# Patient Record
Sex: FEMALE | Race: WHITE | HISPANIC OR LATINO | URBAN - METROPOLITAN AREA
[De-identification: names, ages, dates, MRNs, and addresses within clinical notes are randomized per-mention and may not be internally consistent; named-entity substitution may affect disease eponyms.]

---

## 2017-08-18 ENCOUNTER — EMERGENCY (EMERGENCY)
Facility: HOSPITAL | Age: 22
LOS: 1 days | Discharge: LEFT BEFORE DISCHARGE | End: 2017-08-18
Admitting: EMERGENCY MEDICINE

## 2017-08-18 VITALS
RESPIRATION RATE: 18 BRPM | SYSTOLIC BLOOD PRESSURE: 137 MMHG | OXYGEN SATURATION: 100 % | HEART RATE: 75 BPM | DIASTOLIC BLOOD PRESSURE: 72 MMHG | TEMPERATURE: 98 F

## 2018-07-23 ENCOUNTER — INPATIENT (INPATIENT)
Facility: HOSPITAL | Age: 23
LOS: 1 days | Discharge: PSYCHIATRIC OR SUBSTANCE USE FACILITY | End: 2018-07-25
Attending: HOSPITALIST | Admitting: HOSPITALIST
Payer: MEDICAID

## 2018-07-23 VITALS
DIASTOLIC BLOOD PRESSURE: 81 MMHG | OXYGEN SATURATION: 100 % | SYSTOLIC BLOOD PRESSURE: 128 MMHG | HEART RATE: 94 BPM | TEMPERATURE: 98 F | RESPIRATION RATE: 16 BRPM

## 2018-07-23 LAB
APAP SERPL-MCNC: < 15 UG/ML — LOW (ref 15–25)
BASE EXCESS BLDV CALC-SCNC: -0.7 MMOL/L — SIGNIFICANT CHANGE UP
BASOPHILS # BLD AUTO: 0.01 K/UL — SIGNIFICANT CHANGE UP (ref 0–0.2)
BASOPHILS NFR BLD AUTO: 0.1 % — SIGNIFICANT CHANGE UP (ref 0–2)
BLOOD GAS VENOUS - CREATININE: 0.69 MG/DL — SIGNIFICANT CHANGE UP (ref 0.5–1.3)
CHLORIDE BLDV-SCNC: 109 MMOL/L — HIGH (ref 96–108)
EOSINOPHIL # BLD AUTO: 0.04 K/UL — SIGNIFICANT CHANGE UP (ref 0–0.5)
EOSINOPHIL NFR BLD AUTO: 0.5 % — SIGNIFICANT CHANGE UP (ref 0–6)
ETHANOL BLD-MCNC: < 10 MG/DL — SIGNIFICANT CHANGE UP
GAS PNL BLDV: 139 MMOL/L — SIGNIFICANT CHANGE UP (ref 136–146)
GLUCOSE BLDV-MCNC: 88 — SIGNIFICANT CHANGE UP (ref 70–99)
HCO3 BLDV-SCNC: 23 MMOL/L — SIGNIFICANT CHANGE UP (ref 20–27)
HCT VFR BLD CALC: 35.3 % — SIGNIFICANT CHANGE UP (ref 34.5–45)
HCT VFR BLDV CALC: 39.1 % — SIGNIFICANT CHANGE UP (ref 34.5–45)
HGB BLD-MCNC: 12 G/DL — SIGNIFICANT CHANGE UP (ref 11.5–15.5)
HGB BLDV-MCNC: 12.7 G/DL — SIGNIFICANT CHANGE UP (ref 11.5–15.5)
IMM GRANULOCYTES # BLD AUTO: 0.02 # — SIGNIFICANT CHANGE UP
IMM GRANULOCYTES NFR BLD AUTO: 0.3 % — SIGNIFICANT CHANGE UP (ref 0–1.5)
LACTATE BLDV-MCNC: 1.6 MMOL/L — SIGNIFICANT CHANGE UP (ref 0.5–2)
LYMPHOCYTES # BLD AUTO: 1.11 K/UL — SIGNIFICANT CHANGE UP (ref 1–3.3)
LYMPHOCYTES # BLD AUTO: 14.8 % — SIGNIFICANT CHANGE UP (ref 13–44)
MCHC RBC-ENTMCNC: 30.5 PG — SIGNIFICANT CHANGE UP (ref 27–34)
MCHC RBC-ENTMCNC: 34 % — SIGNIFICANT CHANGE UP (ref 32–36)
MCV RBC AUTO: 89.6 FL — SIGNIFICANT CHANGE UP (ref 80–100)
MONOCYTES # BLD AUTO: 0.45 K/UL — SIGNIFICANT CHANGE UP (ref 0–0.9)
MONOCYTES NFR BLD AUTO: 6 % — SIGNIFICANT CHANGE UP (ref 2–14)
NEUTROPHILS # BLD AUTO: 5.88 K/UL — SIGNIFICANT CHANGE UP (ref 1.8–7.4)
NEUTROPHILS NFR BLD AUTO: 78.3 % — HIGH (ref 43–77)
NRBC # FLD: 0 — SIGNIFICANT CHANGE UP
PCO2 BLDV: 38 MMHG — LOW (ref 41–51)
PH BLDV: 7.41 PH — SIGNIFICANT CHANGE UP (ref 7.32–7.43)
PLATELET # BLD AUTO: 230 K/UL — SIGNIFICANT CHANGE UP (ref 150–400)
PMV BLD: 11.8 FL — SIGNIFICANT CHANGE UP (ref 7–13)
PO2 BLDV: 31 MMHG — LOW (ref 35–40)
POTASSIUM BLDV-SCNC: 3.2 MMOL/L — LOW (ref 3.4–4.5)
RBC # BLD: 3.94 M/UL — SIGNIFICANT CHANGE UP (ref 3.8–5.2)
RBC # FLD: 11.9 % — SIGNIFICANT CHANGE UP (ref 10.3–14.5)
SALICYLATES SERPL-MCNC: 17.4 MG/DL — SIGNIFICANT CHANGE UP (ref 15–30)
SAO2 % BLDV: 56.4 % — LOW (ref 60–85)
WBC # BLD: 7.51 K/UL — SIGNIFICANT CHANGE UP (ref 3.8–10.5)
WBC # FLD AUTO: 7.51 K/UL — SIGNIFICANT CHANGE UP (ref 3.8–10.5)

## 2018-07-23 RX ORDER — ONDANSETRON 8 MG/1
4 TABLET, FILM COATED ORAL ONCE
Qty: 0 | Refills: 0 | Status: COMPLETED | OUTPATIENT
Start: 2018-07-23 | End: 2018-07-23

## 2018-07-23 RX ORDER — SODIUM CHLORIDE 9 MG/ML
1000 INJECTION, SOLUTION INTRAVENOUS ONCE
Qty: 0 | Refills: 0 | Status: COMPLETED | OUTPATIENT
Start: 2018-07-23 | End: 2018-07-23

## 2018-07-23 RX ORDER — ACTIVATED CHARCOAL 25 G/120ML
60 SUSPENSION, ORAL (FINAL DOSE FORM) ORAL ONCE
Qty: 0 | Refills: 0 | Status: COMPLETED | OUTPATIENT
Start: 2018-07-23 | End: 2018-07-23

## 2018-07-23 RX ADMIN — SODIUM CHLORIDE 1000 MILLILITER(S): 9 INJECTION, SOLUTION INTRAVENOUS at 23:14

## 2018-07-23 RX ADMIN — ONDANSETRON 4 MILLIGRAM(S): 8 TABLET, FILM COATED ORAL at 23:14
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
## 2018-07-23 NOTE — ED ADULT NURSE NOTE - OBJECTIVE STATEMENT
pt received alert and oriented x3.pt was bought in by ems for suicidal attempt. pt took 20-30 aspirins and half a bottle of two types on cough syrups. pt had 1 episode of emesis in ER. pt appears slightly lethargic in bed and feels drowsy. pt denies chest pain, sob, lightheadedness. abd soft and non tender. 20 g placed left a/c. labs drawn and sent. pca at bedside for co. pt belongings secured across room 21 . will continue to monitor. pt received alert and oriented x3.pt was bought in by ems for suicidal attempt. pt took 20-30 aspirins and half a bottle of two types on cough syrups. pt had 1 episode of emesis in ER. pt appears slightly lethargic in bed and feels drowsy. pt denies chest pain, sob, lightheadedness ,HI, hearing voices, hallucinations. abd soft and non tender. 20 g placed left a/c. labs drawn and sent. pca at bedside for co. pt belongings secured across room 21 . will continue to monitor.

## 2018-07-23 NOTE — ED PROVIDER NOTE - MEDICAL DECISION MAKING DETAILS
Tox paged, states to give activated charcoal. Will get Salicylate levels q2 hours until 3 downtrending labs result then clear pt for psychiatry. Tox paged, states to give activated charcoal. Will get Salicylate levels q2 hours until 3 downtrending labs result then clear pt for psychiatry. jennifer

## 2018-07-23 NOTE — ED ADULT TRIAGE NOTE - CHIEF COMPLAINT QUOTE
pt brought in by EMS for r/o overdose. pt told ems she took 50x aspirin and cough medicine in attempt to kill herself. +SI. denies HI. denies alcohol use. pt calm at this time, states "I don't feel well". denies pmh

## 2018-07-23 NOTE — ED PROVIDER NOTE - NS ED ROS FT
CONSTITUTIONAL: No fevers, no chills  Eyes: no visual changes  Ears: no ear drainage, no ear pain  Nose: no nasal congestion  Mouth/Throat: no sore throat  Cardiovascular: No Chest pain  Respiratory: No SOB  Gastrointestinal: +nausea and vomiting, no abd pain  Genitourinary: no dysuria, no hematuria  SKIN: no rashes.  NEURO: no headache  PSYCHIATRIC: +SI

## 2018-07-23 NOTE — ED PROVIDER NOTE - SHIFT CHANGE DETAILS
Signed out pending labs and continued treatment. Patient with salicylate OD, given charcoal per tox recommendation. Will need follow up for treatment and will need to be admitted.

## 2018-07-23 NOTE — ED PROVIDER NOTE - OBJECTIVE STATEMENT
23 YOF no chronic pmh, previous SI attempt at age 10, p/w SI attempt, pt took 30-50 pills of aspirin, took promethazine syrup half bottle, drank nyquil half bottle. consumption was staggered but started around 730. +nausea and vomiting. No chest pain, no abdominal pain. Pt denies fevers, chills, cough. Pt states she has been depressed and wanted to kill herself today, but then she called an ambulance because she was worried.

## 2018-07-24 DIAGNOSIS — T39.091A POISONING BY SALICYLATES, ACCIDENTAL (UNINTENTIONAL), INITIAL ENCOUNTER: ICD-10-CM

## 2018-07-24 DIAGNOSIS — F33.2 MAJOR DEPRESSIVE DISORDER, RECURRENT SEVERE WITHOUT PSYCHOTIC FEATURES: ICD-10-CM

## 2018-07-24 DIAGNOSIS — Z00.00 ENCOUNTER FOR GENERAL ADULT MEDICAL EXAMINATION WITHOUT ABNORMAL FINDINGS: ICD-10-CM

## 2018-07-24 DIAGNOSIS — R11.2 NAUSEA WITH VOMITING, UNSPECIFIED: ICD-10-CM

## 2018-07-24 DIAGNOSIS — T50.902A POISONING BY UNSPECIFIED DRUGS, MEDICAMENTS AND BIOLOGICAL SUBSTANCES, INTENTIONAL SELF-HARM, INITIAL ENCOUNTER: ICD-10-CM

## 2018-07-24 DIAGNOSIS — Z98.891 HISTORY OF UTERINE SCAR FROM PREVIOUS SURGERY: Chronic | ICD-10-CM

## 2018-07-24 LAB
ALBUMIN SERPL ELPH-MCNC: 4.5 G/DL — SIGNIFICANT CHANGE UP (ref 3.3–5)
ALP SERPL-CCNC: 54 U/L — SIGNIFICANT CHANGE UP (ref 40–120)
ALT FLD-CCNC: 8 U/L — SIGNIFICANT CHANGE UP (ref 4–33)
AMPHET UR-MCNC: NEGATIVE — SIGNIFICANT CHANGE UP
APAP SERPL-MCNC: < 15 UG/ML — LOW (ref 15–25)
APPEARANCE UR: SIGNIFICANT CHANGE UP
AST SERPL-CCNC: 16 U/L — SIGNIFICANT CHANGE UP (ref 4–32)
BARBITURATES UR SCN-MCNC: NEGATIVE — SIGNIFICANT CHANGE UP
BASE EXCESS BLDA CALC-SCNC: -3 MMOL/L — SIGNIFICANT CHANGE UP
BASE EXCESS BLDA CALC-SCNC: -3.3 MMOL/L — SIGNIFICANT CHANGE UP
BASE EXCESS BLDV CALC-SCNC: -3 MMOL/L — SIGNIFICANT CHANGE UP
BENZODIAZ UR-MCNC: NEGATIVE — SIGNIFICANT CHANGE UP
BILIRUB SERPL-MCNC: 0.2 MG/DL — SIGNIFICANT CHANGE UP (ref 0.2–1.2)
BILIRUB UR-MCNC: NEGATIVE — SIGNIFICANT CHANGE UP
BLOOD GAS VENOUS - CREATININE: 0.64 MG/DL — SIGNIFICANT CHANGE UP (ref 0.5–1.3)
BLOOD UR QL VISUAL: HIGH
BUN SERPL-MCNC: 4 MG/DL — LOW (ref 7–23)
BUN SERPL-MCNC: 4 MG/DL — LOW (ref 7–23)
BUN SERPL-MCNC: 5 MG/DL — LOW (ref 7–23)
BUN SERPL-MCNC: 5 MG/DL — LOW (ref 7–23)
BUN SERPL-MCNC: 6 MG/DL — LOW (ref 7–23)
CALCIUM SERPL-MCNC: 8.1 MG/DL — LOW (ref 8.4–10.5)
CALCIUM SERPL-MCNC: 8.1 MG/DL — LOW (ref 8.4–10.5)
CALCIUM SERPL-MCNC: 8.2 MG/DL — LOW (ref 8.4–10.5)
CALCIUM SERPL-MCNC: 8.2 MG/DL — LOW (ref 8.4–10.5)
CALCIUM SERPL-MCNC: 9 MG/DL — SIGNIFICANT CHANGE UP (ref 8.4–10.5)
CANNABINOIDS UR-MCNC: NEGATIVE — SIGNIFICANT CHANGE UP
CHLORIDE BLDA-SCNC: 113 MMOL/L — HIGH (ref 96–108)
CHLORIDE BLDA-SCNC: 113 MMOL/L — HIGH (ref 96–108)
CHLORIDE BLDV-SCNC: 113 MMOL/L — HIGH (ref 96–108)
CHLORIDE SERPL-SCNC: 104 MMOL/L — SIGNIFICANT CHANGE UP (ref 98–107)
CHLORIDE SERPL-SCNC: 105 MMOL/L — SIGNIFICANT CHANGE UP (ref 98–107)
CHLORIDE SERPL-SCNC: 107 MMOL/L — SIGNIFICANT CHANGE UP (ref 98–107)
CHLORIDE SERPL-SCNC: 108 MMOL/L — HIGH (ref 98–107)
CHLORIDE SERPL-SCNC: 109 MMOL/L — HIGH (ref 98–107)
CK SERPL-CCNC: 42 U/L — SIGNIFICANT CHANGE UP (ref 25–170)
CO2 SERPL-SCNC: 18 MMOL/L — LOW (ref 22–31)
CO2 SERPL-SCNC: 21 MMOL/L — LOW (ref 22–31)
CO2 SERPL-SCNC: 21 MMOL/L — LOW (ref 22–31)
CO2 SERPL-SCNC: 22 MMOL/L — SIGNIFICANT CHANGE UP (ref 22–31)
CO2 SERPL-SCNC: 25 MMOL/L — SIGNIFICANT CHANGE UP (ref 22–31)
COCAINE METAB.OTHER UR-MCNC: NEGATIVE — SIGNIFICANT CHANGE UP
COLOR SPEC: HIGH
CREAT SERPL-MCNC: 0.68 MG/DL — SIGNIFICANT CHANGE UP (ref 0.5–1.3)
CREAT SERPL-MCNC: 0.69 MG/DL — SIGNIFICANT CHANGE UP (ref 0.5–1.3)
CREAT SERPL-MCNC: 0.69 MG/DL — SIGNIFICANT CHANGE UP (ref 0.5–1.3)
CREAT SERPL-MCNC: 0.72 MG/DL — SIGNIFICANT CHANGE UP (ref 0.5–1.3)
CREAT SERPL-MCNC: 0.72 MG/DL — SIGNIFICANT CHANGE UP (ref 0.5–1.3)
GAS PNL BLDV: 141 MMOL/L — SIGNIFICANT CHANGE UP (ref 136–146)
GLUCOSE BLDA-MCNC: 110 MG/DL — HIGH (ref 70–99)
GLUCOSE BLDA-MCNC: 130 MG/DL — HIGH (ref 70–99)
GLUCOSE BLDV-MCNC: 110 — HIGH (ref 70–99)
GLUCOSE SERPL-MCNC: 111 MG/DL — HIGH (ref 70–99)
GLUCOSE SERPL-MCNC: 118 MG/DL — HIGH (ref 70–99)
GLUCOSE SERPL-MCNC: 134 MG/DL — HIGH (ref 70–99)
GLUCOSE SERPL-MCNC: 88 MG/DL — SIGNIFICANT CHANGE UP (ref 70–99)
GLUCOSE SERPL-MCNC: 88 MG/DL — SIGNIFICANT CHANGE UP (ref 70–99)
GLUCOSE UR-MCNC: 50 — SIGNIFICANT CHANGE UP
HCG SERPL-ACNC: < 5 MIU/ML — SIGNIFICANT CHANGE UP
HCO3 BLDA-SCNC: 22 MMOL/L — SIGNIFICANT CHANGE UP (ref 22–26)
HCO3 BLDA-SCNC: 22 MMOL/L — SIGNIFICANT CHANGE UP (ref 22–26)
HCO3 BLDV-SCNC: 22 MMOL/L — SIGNIFICANT CHANGE UP (ref 20–27)
HCT VFR BLDA CALC: 34.7 % — SIGNIFICANT CHANGE UP (ref 34.5–46.5)
HCT VFR BLDA CALC: 35.1 % — SIGNIFICANT CHANGE UP (ref 34.5–46.5)
HCT VFR BLDV CALC: 35.1 % — SIGNIFICANT CHANGE UP (ref 34.5–45)
HGB BLDA-MCNC: 11.3 G/DL — LOW (ref 11.5–15.5)
HGB BLDA-MCNC: 11.4 G/DL — LOW (ref 11.5–15.5)
HGB BLDV-MCNC: 11.4 G/DL — LOW (ref 11.5–15.5)
KETONES UR-MCNC: NEGATIVE — SIGNIFICANT CHANGE UP
LACTATE BLDA-SCNC: 1.4 MMOL/L — SIGNIFICANT CHANGE UP (ref 0.5–2)
LACTATE BLDA-SCNC: 2.2 MMOL/L — HIGH (ref 0.5–2)
LACTATE BLDV-MCNC: 1.4 MMOL/L — SIGNIFICANT CHANGE UP (ref 0.5–2)
LEUKOCYTE ESTERASE UR-ACNC: HIGH
MAGNESIUM SERPL-MCNC: 1.9 MG/DL — SIGNIFICANT CHANGE UP (ref 1.6–2.6)
MAGNESIUM SERPL-MCNC: 1.9 MG/DL — SIGNIFICANT CHANGE UP (ref 1.6–2.6)
METHADONE UR-MCNC: NEGATIVE — SIGNIFICANT CHANGE UP
NITRITE UR-MCNC: NEGATIVE — SIGNIFICANT CHANGE UP
OPIATES UR-MCNC: NEGATIVE — SIGNIFICANT CHANGE UP
OXYCODONE UR-MCNC: NEGATIVE — SIGNIFICANT CHANGE UP
PCO2 BLDA: 28 MMHG — LOW (ref 32–48)
PCO2 BLDA: 36 MMHG — SIGNIFICANT CHANGE UP (ref 32–48)
PCO2 BLDV: 36 MMHG — LOW (ref 41–51)
PCP UR-MCNC: NEGATIVE — SIGNIFICANT CHANGE UP
PH BLDA: 7.39 PH — SIGNIFICANT CHANGE UP (ref 7.35–7.45)
PH BLDA: 7.46 PH — HIGH (ref 7.35–7.45)
PH BLDV: 7.39 PH — SIGNIFICANT CHANGE UP (ref 7.32–7.43)
PH UR: 8.5 — HIGH (ref 4.6–8)
PHOSPHATE SERPL-MCNC: 2.3 MG/DL — LOW (ref 2.5–4.5)
PHOSPHATE SERPL-MCNC: 3.7 MG/DL — SIGNIFICANT CHANGE UP (ref 2.5–4.5)
PO2 BLDA: 177 MMHG — HIGH (ref 83–108)
PO2 BLDA: 49 MMHG — CRITICAL LOW (ref 83–108)
PO2 BLDV: 49 MMHG — HIGH (ref 35–40)
POTASSIUM BLDA-SCNC: 3.1 MMOL/L — LOW (ref 3.4–4.5)
POTASSIUM BLDA-SCNC: 3.3 MMOL/L — LOW (ref 3.4–4.5)
POTASSIUM BLDV-SCNC: 3.3 MMOL/L — LOW (ref 3.4–4.5)
POTASSIUM SERPL-MCNC: 3 MMOL/L — LOW (ref 3.5–5.3)
POTASSIUM SERPL-MCNC: 3.3 MMOL/L — LOW (ref 3.5–5.3)
POTASSIUM SERPL-MCNC: 3.4 MMOL/L — LOW (ref 3.5–5.3)
POTASSIUM SERPL-MCNC: 3.5 MMOL/L — SIGNIFICANT CHANGE UP (ref 3.5–5.3)
POTASSIUM SERPL-MCNC: 3.6 MMOL/L — SIGNIFICANT CHANGE UP (ref 3.5–5.3)
POTASSIUM SERPL-SCNC: 3 MMOL/L — LOW (ref 3.5–5.3)
POTASSIUM SERPL-SCNC: 3.3 MMOL/L — LOW (ref 3.5–5.3)
POTASSIUM SERPL-SCNC: 3.4 MMOL/L — LOW (ref 3.5–5.3)
POTASSIUM SERPL-SCNC: 3.5 MMOL/L — SIGNIFICANT CHANGE UP (ref 3.5–5.3)
POTASSIUM SERPL-SCNC: 3.6 MMOL/L — SIGNIFICANT CHANGE UP (ref 3.5–5.3)
PROT SERPL-MCNC: 7.4 G/DL — SIGNIFICANT CHANGE UP (ref 6–8.3)
PROT UR-MCNC: 300 MG/DL — HIGH
RBC CASTS # UR COMP ASSIST: >50 — HIGH (ref 0–?)
SALICYLATES SERPL-MCNC: 21.6 MG/DL — SIGNIFICANT CHANGE UP (ref 15–30)
SALICYLATES SERPL-MCNC: 29 MG/DL — SIGNIFICANT CHANGE UP (ref 15–30)
SALICYLATES SERPL-MCNC: 33.6 MG/DL — HIGH (ref 15–30)
SALICYLATES SERPL-MCNC: 34.9 MG/DL — HIGH (ref 15–30)
SALICYLATES SERPL-MCNC: 37.7 MG/DL — HIGH (ref 15–30)
SALICYLATES SERPL-MCNC: 38.2 MG/DL — HIGH (ref 15–30)
SAO2 % BLDA: 83 % — LOW (ref 95–99)
SAO2 % BLDA: 99.5 % — HIGH (ref 95–99)
SAO2 % BLDV: 83 % — SIGNIFICANT CHANGE UP (ref 60–85)
SODIUM BLDA-SCNC: 141 MMOL/L — SIGNIFICANT CHANGE UP (ref 136–146)
SODIUM BLDA-SCNC: 141 MMOL/L — SIGNIFICANT CHANGE UP (ref 136–146)
SODIUM SERPL-SCNC: 140 MMOL/L — SIGNIFICANT CHANGE UP (ref 135–145)
SODIUM SERPL-SCNC: 141 MMOL/L — SIGNIFICANT CHANGE UP (ref 135–145)
SODIUM SERPL-SCNC: 142 MMOL/L — SIGNIFICANT CHANGE UP (ref 135–145)
SODIUM SERPL-SCNC: 143 MMOL/L — SIGNIFICANT CHANGE UP (ref 135–145)
SODIUM SERPL-SCNC: 143 MMOL/L — SIGNIFICANT CHANGE UP (ref 135–145)
SP GR SPEC: 1.02 — SIGNIFICANT CHANGE UP (ref 1–1.04)
TSH SERPL-MCNC: 1.91 UIU/ML — SIGNIFICANT CHANGE UP (ref 0.27–4.2)
UROBILINOGEN FLD QL: NORMAL MG/DL — SIGNIFICANT CHANGE UP
WBC UR QL: SIGNIFICANT CHANGE UP (ref 0–?)

## 2018-07-24 PROCEDURE — 90792 PSYCH DIAG EVAL W/MED SRVCS: CPT

## 2018-07-24 PROCEDURE — 99291 CRITICAL CARE FIRST HOUR: CPT

## 2018-07-24 PROCEDURE — 99255 IP/OBS CONSLTJ NEW/EST HI 80: CPT

## 2018-07-24 RX ORDER — CHLORHEXIDINE GLUCONATE 213 G/1000ML
1 SOLUTION TOPICAL
Qty: 0 | Refills: 0 | Status: DISCONTINUED | OUTPATIENT
Start: 2018-07-24 | End: 2018-07-25

## 2018-07-24 RX ORDER — LANOLIN ALCOHOL/MO/W.PET/CERES
3 CREAM (GRAM) TOPICAL AT BEDTIME
Qty: 0 | Refills: 0 | Status: DISCONTINUED | OUTPATIENT
Start: 2018-07-24 | End: 2018-07-25

## 2018-07-24 RX ORDER — POTASSIUM CHLORIDE 20 MEQ
40 PACKET (EA) ORAL ONCE
Qty: 0 | Refills: 0 | Status: DISCONTINUED | OUTPATIENT
Start: 2018-07-24 | End: 2018-07-24

## 2018-07-24 RX ORDER — ONDANSETRON 8 MG/1
4 TABLET, FILM COATED ORAL EVERY 8 HOURS
Qty: 0 | Refills: 0 | Status: DISCONTINUED | OUTPATIENT
Start: 2018-07-24 | End: 2018-07-25

## 2018-07-24 RX ORDER — ONDANSETRON 8 MG/1
4 TABLET, FILM COATED ORAL ONCE
Qty: 0 | Refills: 0 | Status: COMPLETED | OUTPATIENT
Start: 2018-07-24 | End: 2018-07-24

## 2018-07-24 RX ORDER — POTASSIUM CHLORIDE 20 MEQ
40 PACKET (EA) ORAL ONCE
Qty: 0 | Refills: 0 | Status: COMPLETED | OUTPATIENT
Start: 2018-07-24 | End: 2018-07-24

## 2018-07-24 RX ORDER — SODIUM BICARBONATE 1 MEQ/ML
0.5 SYRINGE (ML) INTRAVENOUS
Qty: 150 | Refills: 0 | Status: DISCONTINUED | OUTPATIENT
Start: 2018-07-24 | End: 2018-07-24

## 2018-07-24 RX ADMIN — Medication 60 GRAM(S): at 01:48

## 2018-07-24 RX ADMIN — Medication 200 MEQ/KG/HR: at 08:21

## 2018-07-24 RX ADMIN — Medication 40 MILLIEQUIVALENT(S): at 14:22

## 2018-07-24 RX ADMIN — Medication 3 MILLIGRAM(S): at 22:27

## 2018-07-24 RX ADMIN — Medication 200 MEQ/KG/HR: at 05:52

## 2018-07-24 RX ADMIN — ONDANSETRON 4 MILLIGRAM(S): 8 TABLET, FILM COATED ORAL at 01:50

## 2018-07-24 RX ADMIN — Medication 40 MILLIEQUIVALENT(S): at 08:21

## 2018-07-24 RX ADMIN — CHLORHEXIDINE GLUCONATE 1 APPLICATION(S): 213 SOLUTION TOPICAL at 08:21

## 2018-07-24 NOTE — H&P ADULT - PROBLEM SELECTOR PLAN 1
Patient reports ingestion of "30-50" tabs, does not know if enteric coated or not  - Ingestion at ~20:00 and with vomiting one hour later  - Levels continuing to rise,  17.4>21.6>33.6>37.7  - Other than anti-histamines, patient denies other co-ingestions and remainder of Tox screen negative  - No evidence of tachypnea, or metabolic acidosis  - s/p [place], and on Bicarb gtt as rec by tox to ED team  - Will c/w Q2H Salicylates levels, serial blood gas, and BMPs until Salicylates downtrending

## 2018-07-24 NOTE — PROGRESS NOTE ADULT - ASSESSMENT
Patient is a 23F with no PMH presenting after intentional toxic ingestion of Aspirin admitted to ICU for monitoring of ASA levels Patient is a 23F with no PMH presenting after intentional toxic ingestion of Aspirin admitted to ICU for monitoring of ASA levels     Problem/Plan - 1:  ·  Problem: Salicylate overdose.  Plan: Patient reports ingestion of "30-50" tabs, does not know if enteric coated or not  - Ingestion at ~20:00 and with vomiting one hour later  - Levels trending down, currently 29  - Other than anti-histamines, patient denies other co-ingestions and remainder of Tox screen negative  - s/p [place], and on Bicarb gtt  - Clinically stable at this time  - per toxicology recs, levels < 30 currently, d/c bicarb drips and further level checks  - Repeat UA showed urine pH 8.5. Bloody urine since the pt is having a period     Problem/Plan - 2:  ·  Problem: Nausea and vomiting.  Plan: 2/2 ASA overdose, H.H unremarkable, patient denies melena, low suspicion for GI bleed  - Will continue to monitor for further nausea and manage conservatively.  - Zofran for nausea     Problem/Plan - 3:  ·  Problem: Suicide attempt by drug ingestion, initial encounter.   - Psych consulted. Left a message on the phone.      Problem/Plan - 4:  ·  Problem: Healthcare maintenance.  Plan: DVT ppx - Low risk, with toxic anti-platelet levels in system, no need for A/C  Diet - CLD.

## 2018-07-24 NOTE — BEHAVIORAL HEALTH ASSESSMENT NOTE - NSBHCHARTREVIEWLAB_PSY_A_CORE FT
12.0   7.51  )-----------( 230      ( 23 Jul 2018 23:00 )             35.3   07-24    143  |  105  |  4<L>  ----------------------------<  118<H>  3.0<L>   |  25  |  0.69    Ca    8.1<L>      24 Jul 2018 08:30  Phos  3.7     07-24  Mg     1.9     07-24    TPro  7.4  /  Alb  4.5  /  TBili  0.2  /  DBili  x   /  AST  16  /  ALT  8   /  AlkPhos  54  07-23

## 2018-07-24 NOTE — CONSULT NOTE ADULT - ASSESSMENT
23F w/prior ?SA, post-partum depression (never treated) p/w suicidal ingestion of aspirin, promethazine and nyquil. Patient developed symptoms of salicylism but never became significantly acidemic or tachypneic. We recommended AC and bicarbonate once ASA level advanced beyond 30mg/dl. Recommend continuing bicarbonate until level has downtrended below 30mg/dl. Ensure eukalemia (K>4) as alkalinization will not proceed properly when serum is hypokalemic (b/c of H/K+ antiporter).  Patient never demonstrated signs or symptoms of anticholinergic toxicity, rhabdomyolysis from doxylamine, acetaminophen toxicity or dextromethorphan toxicity. Recommendations conveyed to primary team. Psychiatric care as per psychiatry. Please page [phone] with questions.

## 2018-07-24 NOTE — BEHAVIORAL HEALTH ASSESSMENT NOTE - NS ED [facility] MED ROS EYES
No complaints
No respiratory distress. No stridor, Lungs sounds clear with good aeration bilaterally.

## 2018-07-24 NOTE — CONSULT NOTE ADULT - SUBJECTIVE AND OBJECTIVE BOX
MEDICAL TOXICOLOGY CONSULT    HPI: 23F w/no PMH and 1 ?prior SA at age 10 (patient denies) p/w suicidal ingestion of half a bottle unknown dose promethazine, 30-50 tabs unknown dose aspirin (likely 325mg), and half a bottle of Nyquil (650mg APAP, dextromethorphan 20mg, doxylamine 12.5mg) starting at 730PM until ~ 9PM. Reported developed n/v x2 (NBNB), dizziness and tinnitus since the ingestion. Reported mild sob and "fast breathing" initially. Denies dry mouth, tremor, change in MS, abd pain, urinary retention, visual changes, ataxia. Denies current SI but states was overwhelmed with stressors.  Patient denies EtOH abuse, recreational drug abuse. Denies previous episodes of overdose. Lives alone.  Records report post-partum depression and prior MJ use which patient denies.    Patient was given AC x1 in consultation with us, ASA level was trended q2h and started on sodium bicarbonate gtt once ASA level increased past 30mg/dl.     ONSET / TIME of exposure(s): 730PM 18    QUANTITY of exposure(s): 30-50 tabs ASA, 1/2 bottle promethazine, 1/2 bottle Nyquil	    ROUTE of exposure:  ingestion    CONTEXT of exposure: ingestion    ASSOCIATED symptoms: n/v, tinnitus, dizziness, tachypnea/sob    PAST MEDICAL & SURGICAL HISTORY:  Post partum depression  History of         MEDICATION HISTORY:  chlorhexidine 4% Liquid 1 Application(s) Topical <User Schedule>  ondansetron Injectable 4 milliGRAM(s) IV Push every 8 hours PRN  sodium bicarbonate  Infusion 0.5 mEq/kG/Hr IV Continuous <Continuous>      RECREATIONAL / ETHANOL / SUPPLEMENT USE:    SOCIAL Hx:  lives with alone, not currently employed    FAMILY HISTORY:  No pertinent family history in first degree relatives      REVIEW OF SYSTEMS:     General:  no fever, chills, malaise, change in weight or fatigue  Eyes:  no blurry vision, double vision, or diminished acuity  ENT:  +tinnitus, +decreased acuity, no epistaxis, sore throat, dysphagia  Cardiac: no chest pain, syncope, or palpitations  Lung:  no cough, +shortness of breath, no stridor, or wheezing  GI:  no abdominal pain, +nausea, +vomiting, no diarrhea, or constipation  Genitourinary: no dysuria, hematuria, or incontinence  Ortho: no joint pain, swelling, myalgias, atrophy, or spasm  Skin: no rash, lesions, or pruritis  Neuro:  no headache, weakness/numbness, ataxia, change in speech, dizziness, tremor, or seizure  Psych: __+__depression, __+__anxiety, __no__mania, ___+__suicidal, _no___homicidal  Endocrine: no polydypsia, polyuria, heat/cold intolerance  Hematologic: no bleeding, bruising, petechiae, or adenopathy  Immune:  no rhinitis, atopy, immunocompromise, HIV, or cancer    PHYSICAL EXAM  Vital Signs Last 24 Hrs  T(C): 36.6 (2018 09:00), Max: 36.9 (2018 03:33)  T(F): 97.8 (2018 09:00), Max: 98.4 (2018 03:33)  HR: 54 (2018 11:00) (54 - 94)  BP: 93/60 (2018 11:00) (90/56 - 128/81)  BP(mean): 66 (2018 11:00) (59 - 82)  RR: 15 (2018 11:00) (15 - 20)  SpO2: 100% (2018 11:00) (98% - 100%)  General:    Head:  normocephalic & atraumatic  Eyes:  extra-occular movement is intact  Pupils:  __3_ mm, symmetric, reactive to light  Ear, nose, throat:  mucosa is moist  Neck:  supple  Respiratory:  __normal__ effort, clear to auscultation bilaterally, no rales/ronchi/wheezing  Cardiac:  rate is__normal__, normal rhythm____, no rubs/murmurs/gallops  Abdomen:  Soft, nondistended, nontender, +bowel sounds, no organomegaly  :  deferred  Skin:  dry, turgor  Neurologic:  __+_Clonus, _+__ Tremor, Reflexes are___2+__, extremities are supple____cranial nerves II-XII intact, Level of consciousness is__alert__  Psychiatric:  Insight is_moderate___, alert and oriented x__3__, Memory is __intact__, Affect is__appropriate ___    SIGNIFICANT LABORATORY STUDIES:                        12.0   7.51  )-----------( 230      ( 2018 23:00 )             35.3           143  |  105  |  4<L>  ----------------------------<  118<H>  3.0<L>   |  25  |  0.69    Ca    8.1<L>      2018 08:30  Phos  3.7       Mg     1.9         TPro  7.4  /  Alb  4.5  /  TBili  0.2  /  DBili  x   /  AST  16  /  ALT  8   /  AlkPhos  54              Aspirin Level: 34.9<H>   @ 08:30    Aspirin Level: 38.2<H>   @ 06:36    Aspirin Level: 37.7<H>   @ 04:30    Aspirin Level: 33.6<H>   @ 03:10    Aspirin Level: 21.6   @ 00:50  Acetaminophen Level:  < 15.0<L>   @ 00:50    Aspirin Level: 17.4   @ 23:00  Acetaminophen Level:  < 15.0<L>   @ 23:00  Ethanol Level:  < 10   @ 23:00    EKG: NSR @ 80, QRS 92, QTc 440

## 2018-07-24 NOTE — BEHAVIORAL HEALTH ASSESSMENT NOTE - NSBHCHARTREVIEWVS_PSY_A_CORE FT
Vital Signs Last 24 Hrs  T(C): 36.9 (24 Jul 2018 16:00), Max: 36.9 (24 Jul 2018 03:33)  T(F): 98.5 (24 Jul 2018 16:00), Max: 98.5 (24 Jul 2018 16:00)  HR: 68 (24 Jul 2018 16:00) (54 - 94)  BP: 115/66 (24 Jul 2018 16:00) (89/57 - 128/81)  BP(mean): 79 (24 Jul 2018 16:00) (59 - 82)  RR: 8 (24 Jul 2018 16:00) (8 - 20)  SpO2: 100% (24 Jul 2018 16:00) (98% - 100%)
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
## 2018-07-24 NOTE — H&P ADULT - NSHPREVIEWOFSYSTEMS_GEN_ALL_CORE
CONSTITUTIONAL: No fever, weight loss, or fatigue  EYES: No eye pain, visual disturbances,  ENMT:  No difficulty hearing, +tinnitus, No sinus or throat pain  RESPIRATORY: No cough, wheezing, chills or hemoptysis; No shortness of breath  CARDIOVASCULAR: No chest pain, palpitations, dizziness, or leg swelling  GASTROINTESTINAL: No abdominal or epigastric pain. No nausea, vomiting, or hematemesis; No diarrhea or constipation. No melena or hematochezia.  GENITOURINARY: No dysuria, frequency, hematuria, or incontinence  NEUROLOGICAL: No headaches, loss of strength, numbness, or tremors  SKIN: No itching, burning, rashes, or lesions   ENDOCRINE: No polydipsia or polyuria  MUSCULOSKELETAL: No joint pain or swelling;   PSYCHIATRIC: Denies depression, anxiety  HEME/LYMPH: No easy bruising, or bleeding gums

## 2018-07-24 NOTE — H&P ADULT - NSHPLABSRESULTS_GEN_ALL_CORE
LABS:                        12.0   7.51  )-----------( 230      ( 23 Jul 2018 23:00 )             35.3     24 Jul 2018 04:30    140    |  107    |  5      ----------------------------<  111    3.4     |  21     |  0.69     Ca    8.2        24 Jul 2018 04:30    TPro  7.4    /  Alb  4.5    /  TBili  0.2    /  DBili  x      /  AST  16     /  ALT  8      /  AlkPhos  54     23 Jul 2018 23:00      CAPILLARY BLOOD GLUCOSE      Salicylate Level, Serum (07.24.18 @ 04:30)    Salicylate Level, Serum: 37.7: TOXIC VALUES  ---------------  ACUTE INGESTION      > 80 mg/dL  CHRONIC INGESTION    > 40 mg/dL mg/dL      Salicylate Level, Serum (07.24.18 @ 03:10)    Salicylate Level, Serum: 33.6: TOXIC VALUES  ---------------  ACUTE INGESTION      > 80 mg/dL  CHRONIC INGESTION    > 40 mg/dL mg/dL      Salicylate Level, Serum (07.24.18 @ 00:50)    Salicylate Level, Serum: 21.6: TOXIC VALUES  ---------------  ACUTE INGESTION      > 80 mg/dL  CHRONIC INGESTION    > 40 mg/dL mg/dL        BLOOD CULTURE    RADIOLOGY & ADDITIONAL TESTS:    Imaging Personally Reviewed:  [ ] YES

## 2018-07-24 NOTE — CONSULT NOTE ADULT - PROBLEM SELECTOR RECOMMENDATION 9
-evidence of salicylism; continue sodium bicarbonate gtt until ASA level downtrended below 30mg/dl  -replete potassium to K>4 to allow for proper alkalinization  -no e/o toxicity from promethazine or nyquil  -psychiatric care as per psychiatry

## 2018-07-24 NOTE — H&P ADULT - NSHPSOCIALHISTORY_GEN_ALL_CORE
Lives alone  Previously worked as HHA, now unemployed  Denies smoking, EtoH use, recreational drug use

## 2018-07-24 NOTE — CONSULT NOTE ADULT - ATTENDING COMMENTS
MD Rodriguez:  patient seen and evaluated with the fellow.  I was present for key portions of the history & physical, and I agree with the impression & plan.  22 yo F, who presented to the ED shortly after intentional ASA overdose.  Given her initial respiratory alkalosis and elevated ASA level, a bicarbonate gtt was initiated so as to promote ion-trapping of salicylate in the serum, and excretion into the urine.  Her ASA has peaked at 38, and has since diminished to 21, with 3 declining serial levels.  Although she still reports tinnitus, she is hemodynamically wnl, and will no longer require bicarbonate gtt.  She is medically cleared.  Dispo per psychiatry.

## 2018-07-24 NOTE — H&P ADULT - PROBLEM SELECTOR PLAN 2
2/2 ASA overdose, H.H unremarkable, patient denies melena, low suspicion for GI bleed  - Will continue to monitor for further nausea and manage conservatively

## 2018-07-24 NOTE — H&P ADULT - ATTENDING COMMENTS
pt seen and examined at bedside, pt with hx suicide attempt at age   10 , hx post partum depression five years ago, not f/u outpt.  now presents with overdose 30-50 aspirin pills, half a bottle of   promethazine and nyquil. states ringing in the ER. pt given charcoal  in the er.  level 17 to 31 after 4 hours, toxicology requesting bicarb drip  and alkalinization with bmp q 2 hrs.  Pt alert and awake,  no icterus, lungs clear, heart tachy  abdmen benign    -admit to icu   -bicarb drip at 200 cc/hr, check bmp q 2 hrs, check abg  -f/u aspirin level q 2 hrs  -psych evaluation  -constant observation    critically ill with aspirin overdose pt seen and examined at bedside, pt with hx suicide attempt at age   10 , hx post partum depression five years ago, not f/u outpt.  now presents with overdose 30-50 aspirin pills, half a bottle of   promethazine and nyquil. states ringing in the ER. pt given charcoal  in the er.  level 17 to 31 after 4 hours, toxicology requesting bicarb drip  and alkalinization with bmp q 2 hrs.  Pt alert and awake,  no icterus, lungs clear, heart tachy  abdomen n benign  bicarb drip  monitor asa levels q 2 hrs  -admit to icu   -bicarb drip at 200 cc/hr, check bmp q 2 hrs, check abg  -f/u aspirin level q 2 hrs  -psych evaluation  -constant observation    critically ill with aspirin overdose

## 2018-07-24 NOTE — H&P ADULT - NSHPPHYSICALEXAM_GEN_ALL_CORE
Vital Signs Last 24 Hrs  T(C): 36.7 (24 Jul 2018 05:29), Max: 36.9 (24 Jul 2018 03:33)  T(F): 98 (24 Jul 2018 05:29), Max: 98.4 (24 Jul 2018 03:33)  HR: 90 (24 Jul 2018 05:29) (70 - 94)  BP: 114/72 (24 Jul 2018 05:29) (101/69 - 128/81)  BP(mean): 82 (24 Jul 2018 05:29) (82 - 82)  RR: 16 (24 Jul 2018 05:29) (16 - 17)  SpO2: 99% (24 Jul 2018 05:29) (99% - 100%)    PHYSICAL EXAM:  GENERAL: NAD, well-groomed, well-developed  HEAD:  Atraumatic, Normocephalic  EYES: EOMI, PERRLA. b/l end gaze nystagmus  ENMT: Moist mucous membranes, dried charcoal around mouth  NECK: Supple, No JVD  NERVOUS SYSTEM: AOX3, motor and sensation grossly intact in b/l UE and b/l LE  PSYCHIATRIC: Appropriate affect and mood  CHEST/LUNG: Clear to auscultation bilaterally; No rales, rhonchi, wheezing, or rubs  HEART: Regular rate and rhythm; No murmurs, rubs, or gallops. No LE edema  ABDOMEN: Soft, Nontender, Nondistended; Bowel sounds present  EXTREMITIES:  2+ Peripheral Pulses, No clubbing, cyanosis  SKIN: No rashes or lesions

## 2018-07-24 NOTE — PROGRESS NOTE ADULT - SUBJECTIVE AND OBJECTIVE BOX
CHIEF COMPLAINT:    Interval Events: No acute event overnight. Patient is seen and examined at the bedside this morning.     REVIEW OF SYSTEMS:  Constitutional: No fever, or chills. No recent weight loss or weight gain.   HEENT: No dry eyes or eye irritation. No postnasal drip or nasal congestion.  CV: No chest pain, or palpitations. No orthopnea.   Resp: No cough, or sputum production. No shortness of breath or dyspnea on exertion.   GI: No nausea or vomiting. No diarrhea or constipation. No abdominal pain.   : No dysuria, no nocturia or increased urinary frequency.  Musculoskeletal: No back pain, no myalgias  Skin: No rash or itchiness.   Neurological: No headache or dizziness. No syncope, no weakness, numbness.  Psychiatric: Denies depressed mood.   Endocrine: No cold or heat intolerance. No dry skin.  Hematologic/Lymphatic: No anemia or bleeding problem.     OBJECTIVE:  Vital Signs Last 24 Hrs  T(C): 36.6 (24 Jul 2018 09:00), Max: 36.9 (24 Jul 2018 03:33)  T(F): 97.8 (24 Jul 2018 09:00), Max: 98.4 (24 Jul 2018 03:33)  HR: 54 (24 Jul 2018 11:00) (54 - 94)  BP: 93/60 (24 Jul 2018 11:00) (90/56 - 128/81)  BP(mean): 66 (24 Jul 2018 11:00) (59 - 82)  RR: 15 (24 Jul 2018 11:00) (15 - 20)  SpO2: 100% (24 Jul 2018 11:00) (98% - 100%)    07-23 @ 07:01 - 07-24 @ 07:00  --------------------------------------------------------  IN: 400 mL / OUT: 0 mL / NET: 400 mL    07-24 @ 07:01 - 07-24 @ 11:55  --------------------------------------------------------  IN: 1000 mL / OUT: 0 mL / NET: 1000 mL      CAPILLARY BLOOD GLUCOSE          PHYSICAL EXAM:  General: Alert and cooperative. Not in acute stress. Well developed, well nourished.   Head: Normocephalic, no mass and lesions.  Eyes: Intact visual fields. PERRLA, clear conjunctiva. EOMI, no ptosis.   Throat: Oral cavity and pharynx normal. No inflammation, swelling, exudate, or lesions. Teeth and gingiva in good general condition.  Neck: No lymphadenopathy, no masses, no thyromegaly. Carotid pulses 2+. No JVD.   Respiratory: Bilateral lung clear to auscultation, no crackles, no wheezes, no rhonchi.   Cardiovascular: S1/S2 auscultated, no murmur, or gallop. Rhythm is regular. There is no peripheral edema, cyanosis or pallor. Extremities are warm and well perfused. Capillary refill is less than 2 seconds. No carotid bruits.  Abdomen: Soft, non-tender, nondistended, no guarding or rebound tenderness. Active bowel sounds in all 4 quadrants. No hepatosplenomegaly.   Musculoskeletal: Adequately aligned spine. ROM intact spine and extremities. No joint erythema or tenderness. Normal muscular development. Normal gait.   Extremities: No significant deformity or joint abnormality. Peripheral pulses intact. No varicosities. No peripheral edema, atrophy.   Skin: Intact, no rash. Normal color, texture and turgor with no lesions or eruptions.  Neurological: AOAx4. CN2-12 grosslly intact. Strength and sensation symmetric and intact throughout. Reflexes 2+ throughout. Cerebellar testing normal.  Psychiatry: The mental examination revealed the patient was oriented to person, place, and time. The patient was able to demonstrate good judgement and reason, without hallucinations, abnormal affect or abnormal behaviors during the examination. Patient is not suicidal.     LINES:    HOSPITAL MEDICATIONS:            ondansetron Injectable 4 milliGRAM(s) IV Push every 8 hours PRN          sodium bicarbonate  Infusion 0.5 mEq/kG/Hr IV Continuous <Continuous>      chlorhexidine 4% Liquid 1 Application(s) Topical <User Schedule>        LABS:                        12.0   7.51  )-----------( 230      ( 23 Jul 2018 23:00 )             35.3     Hgb Trend: 12.0<--  07-24    143  |  105  |  4<L>  ----------------------------<  118<H>  3.0<L>   |  25  |  0.69    Ca    8.1<L>      24 Jul 2018 08:30  Phos  3.7     07-24  Mg     1.9     07-24    TPro  7.4  /  Alb  4.5  /  TBili  0.2  /  DBili  x   /  AST  16  /  ALT  8   /  AlkPhos  54  07-23    Creatinine Trend: 0.69<--, 0.68<--, 0.69<--, 0.72<--      Arterial Blood Gas:  07-24 @ 06:36  7.46/28/177/22/99.5/-3.3  ABG lactate: 2.2  Arterial Blood Gas:  07-24 @ 04:30  7.39/36/49/22/83.0/-3.0  ABG lactate: 1.4    Venous Blood Gas:  07-24 @ 04:11  7.39/36/49/22/83.0  VBG Lactate: 1.4  Venous Blood Gas:  07-23 @ 23:00  7.41/38/31/23/56.4  VBG Lactate: 1.6      MICROBIOLOGY:     RADIOLOGY:  [ ] Reviewed and interpreted by me    EKG: Interval Events: No acute event overnight. Patient is seen and examined at the bedside this morning. Denies fever or chills. Still endorses tinnitus.     OBJECTIVE:  Vital Signs Last 24 Hrs  T(C): 36.6 (24 Jul 2018 09:00), Max: 36.9 (24 Jul 2018 03:33)  T(F): 97.8 (24 Jul 2018 09:00), Max: 98.4 (24 Jul 2018 03:33)  HR: 54 (24 Jul 2018 11:00) (54 - 94)  BP: 93/60 (24 Jul 2018 11:00) (90/56 - 128/81)  BP(mean): 66 (24 Jul 2018 11:00) (59 - 82)  RR: 15 (24 Jul 2018 11:00) (15 - 20)  SpO2: 100% (24 Jul 2018 11:00) (98% - 100%)    07-23 @ 07:01  -  07-24 @ 07:00  --------------------------------------------------------  IN: 400 mL / OUT: 0 mL / NET: 400 mL    07-24 @ 07:01 - 07-24 @ 11:55  --------------------------------------------------------  IN: 1000 mL / OUT: 0 mL / NET: 1000 mL      CAPILLARY BLOOD GLUCOSE    PHYSICAL EXAM:  GENERAL: NAD, well-groomed, well-developed  HEENT:  Atraumatic, Normocephalic, EOMI  NECK: Supple  NERVOUS SYSTEM: AOX3  PSYCHIATRIC: Appropriate and cooperative  CHEST/LUNG: CTAB, no wheezing or rales  HEART: RRR, S1 and S2, no murmurs  ABDOMEN: Soft NTND +BS  EXTREMITIES: No edema  SKIN: No rashes or lesions    HOSPITAL MEDICATIONS:    ondansetron Injectable 4 milliGRAM(s) IV Push every 8 hours PRN    sodium bicarbonate  Infusion 0.5 mEq/kG/Hr IV Continuous <Continuous>    chlorhexidine 4% Liquid 1 Application(s) Topical <User Schedule>    LABS:                        12.0   7.51  )-----------( 230      ( 23 Jul 2018 23:00 )             35.3     Hgb Trend: 12.0<--  07-24    143  |  105  |  4<L>  ----------------------------<  118<H>  3.0<L>   |  25  |  0.69    Ca    8.1<L>      24 Jul 2018 08:30  Phos  3.7     07-24  Mg     1.9     07-24    TPro  7.4  /  Alb  4.5  /  TBili  0.2  /  DBili  x   /  AST  16  /  ALT  8   /  AlkPhos  54  07-23    Creatinine Trend: 0.69<--, 0.68<--, 0.69<--, 0.72<--      Arterial Blood Gas:  07-24 @ 06:36  7.46/28/177/22/99.5/-3.3  ABG lactate: 2.2  Arterial Blood Gas:  07-24 @ 04:30  7.39/36/49/22/83.0/-3.0  ABG lactate: 1.4    Venous Blood Gas:  07-24 @ 04:11  7.39/36/49/22/83.0  VBG Lactate: 1.4  Venous Blood Gas:  07-23 @ 23:00  7.41/38/31/23/56.4  VBG Lactate: 1.6      MICROBIOLOGY:     RADIOLOGY:  [ ] Reviewed and interpreted by me    EKG:

## 2018-07-24 NOTE — H&P ADULT - HISTORY OF PRESENT ILLNESS
Patient is a 23F with no PMH presenting after intentional toxic ingestion of Aspirin. Patient reports that she took between 30-50 tabs of Aspirin between 19:30-20:30 on 7/23. Also reports taking Nyquil (approx 1 bottle), promethazine (1/2 bottle) over this time period. States that she took these as she was feeling "overwhelmed" with stressors, including hunting for a job, car recently being booted. Also expressed relationship stressors to ED team. Patient states that approx 1 hour after ingestion she had multiple episodes of large volume vomitus. Non-bilious, non-bloody. Denies seeing pill fragments in vomit. Currently denies further nausea. Denies diarrhea, abd pain, melena, hematochezia. States that she has developed b/l tinnitus. Denies dyspnea, headache, blurred vision. Denies fevers, chills, malaise. Patient denies EtOH abuse, recreational drug abuse. Denies previous episodes of overdose. Lives alone. Reports thoughts of self-harm at this time.    On review of charts, patient had reported a ?suicidal attempt at age of 10, was vague regarding this on this history. Also on outpatient records, patient had caesarian delivery in 2013 with post-partum course c/w post-partum depression at which time recommendation was made to f/u with [facility] which patient has not done. She denies ever seeing a psychiatrist or having inpatient psychiatric treatment. Outpatient records also report h/o marijuana use.    ED Course  Vitals:    Temp 98   Hr 94     /81    RR 16   SPO2 100%  Patient received LR 1L,   Zofran,  had tox c/s placed who rec serial ASA levels, charcoal, NaHCO3 gtt

## 2018-07-24 NOTE — BEHAVIORAL HEALTH ASSESSMENT NOTE - SUMMARY
Patient is a 23 woman with no significant medical or psychiatric history now presenting after an overdose of Aspirin, promethazine, and Nyquil with the intention of ending her life. Patient reports that she took between 30-50 tabs of Aspirin between 19:30-20:30 on 7/23. Also reports taking Nyquil (approximately 1 bottle), promethazine (1/2 bottle) over this time period. States that she took these as she was feeling "overwhelmed" with stressors, including recent loss of her job, and end of her relationship with her boyfriend who became involved with one of her friends.

## 2018-07-25 ENCOUNTER — TRANSCRIPTION ENCOUNTER (OUTPATIENT)
Age: 23
End: 2018-07-25

## 2018-07-25 ENCOUNTER — INPATIENT (INPATIENT)
Facility: HOSPITAL | Age: 23
LOS: 11 days | Discharge: HOME | End: 2018-08-06
Attending: PSYCHIATRY & NEUROLOGY | Admitting: PSYCHIATRY & NEUROLOGY
Payer: MEDICAID

## 2018-07-25 VITALS — WEIGHT: 125 LBS | TEMPERATURE: 99 F | HEIGHT: 63 IN

## 2018-07-25 VITALS
HEART RATE: 66 BPM | OXYGEN SATURATION: 100 % | DIASTOLIC BLOOD PRESSURE: 70 MMHG | TEMPERATURE: 98 F | RESPIRATION RATE: 17 BRPM | SYSTOLIC BLOOD PRESSURE: 99 MMHG

## 2018-07-25 DIAGNOSIS — F33.9 MAJOR DEPRESSIVE DISORDER, RECURRENT, UNSPECIFIED: ICD-10-CM

## 2018-07-25 DIAGNOSIS — Z98.891 HISTORY OF UTERINE SCAR FROM PREVIOUS SURGERY: Chronic | ICD-10-CM

## 2018-07-25 LAB
BASOPHILS # BLD AUTO: 0.01 K/UL — SIGNIFICANT CHANGE UP (ref 0–0.2)
BASOPHILS NFR BLD AUTO: 0.2 % — SIGNIFICANT CHANGE UP (ref 0–2)
BUN SERPL-MCNC: 6 MG/DL — LOW (ref 7–23)
CALCIUM SERPL-MCNC: 8.3 MG/DL — LOW (ref 8.4–10.5)
CHLORIDE SERPL-SCNC: 108 MMOL/L — HIGH (ref 98–107)
CO2 SERPL-SCNC: 23 MMOL/L — SIGNIFICANT CHANGE UP (ref 22–31)
CREAT SERPL-MCNC: 0.77 MG/DL — SIGNIFICANT CHANGE UP (ref 0.5–1.3)
EOSINOPHIL # BLD AUTO: 0.07 K/UL — SIGNIFICANT CHANGE UP (ref 0–0.5)
EOSINOPHIL NFR BLD AUTO: 1.4 % — SIGNIFICANT CHANGE UP (ref 0–6)
GLUCOSE SERPL-MCNC: 87 MG/DL — SIGNIFICANT CHANGE UP (ref 70–99)
HCT VFR BLD CALC: 32.1 % — LOW (ref 34.5–45)
HGB BLD-MCNC: 10.6 G/DL — LOW (ref 11.5–15.5)
IMM GRANULOCYTES # BLD AUTO: 0.01 # — SIGNIFICANT CHANGE UP
IMM GRANULOCYTES NFR BLD AUTO: 0.2 % — SIGNIFICANT CHANGE UP (ref 0–1.5)
LYMPHOCYTES # BLD AUTO: 2.12 K/UL — SIGNIFICANT CHANGE UP (ref 1–3.3)
LYMPHOCYTES # BLD AUTO: 42.1 % — SIGNIFICANT CHANGE UP (ref 13–44)
MAGNESIUM SERPL-MCNC: 2 MG/DL — SIGNIFICANT CHANGE UP (ref 1.6–2.6)
MCHC RBC-ENTMCNC: 30.8 PG — SIGNIFICANT CHANGE UP (ref 27–34)
MCHC RBC-ENTMCNC: 33 % — SIGNIFICANT CHANGE UP (ref 32–36)
MCV RBC AUTO: 93.3 FL — SIGNIFICANT CHANGE UP (ref 80–100)
MONOCYTES # BLD AUTO: 0.59 K/UL — SIGNIFICANT CHANGE UP (ref 0–0.9)
MONOCYTES NFR BLD AUTO: 11.7 % — SIGNIFICANT CHANGE UP (ref 2–14)
NEUTROPHILS # BLD AUTO: 2.24 K/UL — SIGNIFICANT CHANGE UP (ref 1.8–7.4)
NEUTROPHILS NFR BLD AUTO: 44.4 % — SIGNIFICANT CHANGE UP (ref 43–77)
NRBC # FLD: 0 — SIGNIFICANT CHANGE UP
PHOSPHATE SERPL-MCNC: 3.7 MG/DL — SIGNIFICANT CHANGE UP (ref 2.5–4.5)
PLATELET # BLD AUTO: 181 K/UL — SIGNIFICANT CHANGE UP (ref 150–400)
PMV BLD: 11.8 FL — SIGNIFICANT CHANGE UP (ref 7–13)
POTASSIUM SERPL-MCNC: 3.9 MMOL/L — SIGNIFICANT CHANGE UP (ref 3.5–5.3)
POTASSIUM SERPL-SCNC: 3.9 MMOL/L — SIGNIFICANT CHANGE UP (ref 3.5–5.3)
RBC # BLD: 3.44 M/UL — LOW (ref 3.8–5.2)
RBC # FLD: 12.4 % — SIGNIFICANT CHANGE UP (ref 10.3–14.5)
SODIUM SERPL-SCNC: 142 MMOL/L — SIGNIFICANT CHANGE UP (ref 135–145)
WBC # BLD: 5.04 K/UL — SIGNIFICANT CHANGE UP (ref 3.8–10.5)
WBC # FLD AUTO: 5.04 K/UL — SIGNIFICANT CHANGE UP (ref 3.8–10.5)

## 2018-07-25 PROCEDURE — 99233 SBSQ HOSP IP/OBS HIGH 50: CPT

## 2018-07-25 PROCEDURE — 93010 ELECTROCARDIOGRAM REPORT: CPT

## 2018-07-25 PROCEDURE — 99239 HOSP IP/OBS DSCHRG MGMT >30: CPT

## 2018-07-25 RX ORDER — POTASSIUM PHOSPHATE, MONOBASIC POTASSIUM PHOSPHATE, DIBASIC 236; 224 MG/ML; MG/ML
15 INJECTION, SOLUTION INTRAVENOUS ONCE
Qty: 0 | Refills: 0 | Status: COMPLETED | OUTPATIENT
Start: 2018-07-25 | End: 2018-07-25

## 2018-07-25 RX ORDER — ESCITALOPRAM OXALATE 10 MG/1
5 TABLET, FILM COATED ORAL DAILY
Qty: 0 | Refills: 0 | Status: DISCONTINUED | OUTPATIENT
Start: 2018-07-25 | End: 2018-07-28

## 2018-07-25 RX ORDER — POTASSIUM CHLORIDE 20 MEQ
40 PACKET (EA) ORAL ONCE
Qty: 0 | Refills: 0 | Status: COMPLETED | OUTPATIENT
Start: 2018-07-25 | End: 2018-07-25

## 2018-07-25 RX ORDER — LANOLIN ALCOHOL/MO/W.PET/CERES
1 CREAM (GRAM) TOPICAL
Qty: 0 | Refills: 0 | COMMUNITY
Start: 2018-07-25

## 2018-07-25 RX ORDER — LANOLIN ALCOHOL/MO/W.PET/CERES
3 CREAM (GRAM) TOPICAL AT BEDTIME
Qty: 0 | Refills: 0 | Status: DISCONTINUED | OUTPATIENT
Start: 2018-07-25 | End: 2018-08-06

## 2018-07-25 RX ADMIN — Medication 3 MILLIGRAM(S): at 21:24

## 2018-07-25 RX ADMIN — Medication 40 MILLIEQUIVALENT(S): at 03:36

## 2018-07-25 RX ADMIN — Medication 1 MILLIGRAM(S): at 21:24

## 2018-07-25 RX ADMIN — POTASSIUM PHOSPHATE, MONOBASIC POTASSIUM PHOSPHATE, DIBASIC 62.5 MILLIMOLE(S): 236; 224 INJECTION, SOLUTION INTRAVENOUS at 01:07

## 2018-07-25 NOTE — PROGRESS NOTE ADULT - SUBJECTIVE AND OBJECTIVE BOX
Patient is a 23y old  Female who presents with a chief complaint of suicidal Ideation and overdose    SUBJECTIVE / OVERNIGHT EVENTS:    Patient feels well, a little anxious  Ambulating  No CP, SOB, voiding  No vomiting since arrival in ED, eating lunch presently  No BM yet, states has difficulty when not at home    MEDICATIONS  (STANDING):  melatonin 3 milliGRAM(s) Oral at bedtime    MEDICATIONS  (PRN):  LORazepam     Tablet 0.5 milliGRAM(s) Oral every 6 hours PRN Anxiety  ondansetron Injectable 4 milliGRAM(s) IV Push every 8 hours PRN Nausea and/or Vomiting    T(C): 36.7 (18 @ 06:47), Max: 36.9 (18 @ 16:00)  HR: 59 (18 @ 06:47) (56 - 80)  BP: 102/58 (18 @ 06:47) (88/54 - 115/70)  RR: 17 (18 @ 06:47) (8 - 21)  SpO2: 100% (18 @ 06:47) (98% - 100%)    I&O's Summary    2018 07:01  -  2018 07:00  --------------------------------------------------------  IN: 1450 mL / OUT: 1800 mL / NET: -350 mL    PHYSICAL EXAM:  GENERAL: NAD, well-developed  HEAD:  Atraumatic, Normocephalic  EYES: EOMI, PERRLA, conjunctiva and sclera clear  NECK: Supple, No JVD  CHEST/LUNG: Clear to auscultation bilaterally; No wheeze  HEART: Regular rate and rhythm; No murmurs, rubs, or gallops  ABDOMEN: Soft, Nontender, Nondistended; Bowel sounds present  EXTREMITIES:   warm and well perfused, No clubbing, cyanosis, or edema  PSYCH: AAOx3  NEUROLOGY: non-focal  SKIN: No rashes or lesions    LABS:                        10.6   5.04  )-----------( 181      ( 2018 03:03 )             32.1     07-    142  |  108<H>  |  6<L>  ----------------------------<  87  3.9   |  23  |  0.77    Ca    8.3<L>      2018 03:03  Phos  3.7       Mg     2.0         TPro  7.4  /  Alb  4.5  /  TBili  0.2  /  DBili  x   /  AST  16  /  ALT  8   /  AlkPhos  54        CARDIAC MARKERS ( 2018 04:30 )  x     / x     / 42 u/L / x     / x          Urinalysis Basic - ( 2018 15:00 )    Color: RED / Appearance: TURBID / S.016 / pH: 8.5  Gluc: 50 / Ketone: NEGATIVE  / Bili: NEGATIVE / Urobili: NORMAL mg/dL   Blood: LARGE / Protein: 300 mg/dL / Nitrite: NEGATIVE   Leuk Esterase: MODERATE / RBC: >50 / WBC 10-25   Sq Epi: x / Non Sq Epi: x / Bacteria: x    ABG - ( 2018 06:36 )  pH, Arterial: 7.46  pH, Blood: x     /  pCO2: 28    /  pO2: 177   / HCO3: 22    / Base Excess: -3.3  /  SaO2: 99.5        Microbiology:  VBG  @ 04:11  pH: 7.39/pCO2: 36/pO2: 49/HCO3: 22/lactate: 1.4  VBG  @ 23:00  pH: 7.41/pCO2: 38/pO2: 31/HCO3: 23/lactate: 1.6      Consultant(s) Notes Reviewed:  Psych, Tox    Care Discussed with Consultants/Other Providers:

## 2018-07-25 NOTE — DISCHARGE NOTE ADULT - MEDICATION SUMMARY - MEDICATIONS TO TAKE
I will START or STAY ON the medications listed below when I get home from the hospital:    LORazepam 0.5 mg oral tablet  -- 1 tab(s) by mouth every 6 hours, As needed, Anxiety  -- Indication: For Anxiety/Agitation    melatonin 3 mg oral tablet  -- 1 tab(s) by mouth once a day (at bedtime)  -- Indication: For Insomnia

## 2018-07-25 NOTE — DISCHARGE NOTE ADULT - CARE PLAN
Principal Discharge DX:	Salicylate overdose  Goal:	Prevent future occurrence  Assessment and plan of treatment:	You were admitted to the medical ICU upon hospital admission and monitored carefully - Your medical status has improved and you are medically stable for discharge to [facility] for further psychiatric management.  Secondary Diagnosis:	Suicide attempt by drug ingestion, initial encounter  Assessment and plan of treatment:	Your medical status has improved and you are medically stable for discharge to [facility] for further psychiatric management.  Secondary Diagnosis:	Severe episode of recurrent major depressive disorder, without psychotic features  Assessment and plan of treatment:	Your medical status has improved and you are medically stable for discharge to [facility] facility for further psychiatric management.

## 2018-07-25 NOTE — PROGRESS NOTE BEHAVIORAL HEALTH - NSBHCHARTREVIEWLAB_PSY_A_CORE FT
10.6   5.04  )-----------( 181      ( 25 Jul 2018 03:03 )             32.1   07-25    142  |  108<H>  |  6<L>  ----------------------------<  87  3.9   |  23  |  0.77    Ca    8.3<L>      25 Jul 2018 03:03  Phos  3.7     07-25  Mg     2.0     07-25    TPro  7.4  /  Alb  4.5  /  TBili  0.2  /  DBili  x   /  AST  16  /  ALT  8   /  AlkPhos  54  07-23

## 2018-07-25 NOTE — DISCHARGE NOTE ADULT - PATIENT PORTAL LINK FT
You can access the [facility] Patient Portal, offered by [facility], by registering with the following website: [email or web address]

## 2018-07-25 NOTE — PROGRESS NOTE BEHAVIORAL HEALTH - SUMMARY
Patient is a 23 woman with no significant medical or psychiatric history now presenting after an overdose of Aspirin, promethazine, and Nyquil with the intention of ending her life. Patient reports that she took between 30-50 tabs of Aspirin between 19:30-20:30 on 7/23. Also reports taking Nyquil (approximately 1 bottle), promethazine (1/2 bottle) over this time period. States that she took these as she was feeling "overwhelmed" with stressors, including recent loss of her job, and end of her relationship with her boyfriend who became involved with one of her friends.  She is denying any active plan to harm herself and says she does not know how this happened when asked about her overdose, but feels she was overwhelmed.

## 2018-07-25 NOTE — DISCHARGE NOTE ADULT - SECONDARY DIAGNOSIS.
Suicide attempt by drug ingestion, initial encounter Severe episode of recurrent major depressive disorder, without psychotic features

## 2018-07-25 NOTE — PROGRESS NOTE BEHAVIORAL HEALTH - NSBHFUPINTERVALHXFT_PSY_A_CORE
Pt is distressed that she is in the hospital for an attempted suicide and is fearful about going to a psychiatric unit.  She was able to speak about her five year old daughter and says that her daughter is with her paternal grandparents for the summer and will come back to her in the fall.  She did not tell her child's father or parents about being in the hospital.  She agrees to try an antidepressant and discussed starting Effexor.

## 2018-07-25 NOTE — DISCHARGE NOTE ADULT - HOSPITAL COURSE
24 yo M with no PMH p/w ASA ingestion took 30-50 pills baby ASA + Promethazine x1 bottle + Nyquil x1 bottle at 8 pm 7/23 (d/t lost job, car problem, child in 2013, postpartum depression not treated) p/w tinnitus and nausea.    Salicylate overdose  - Patient reports ingestion of "30-50" tabs, does not know if enteric coated or not  - Ingestion at ~20:00 and with vomiting one hour later  - Levels continuing to rise,  17.4>21.6>33.6>37.7  - Other than anti-histamines, patient denies other co-ingestions and remainder of Tox screen negative  - No evidence of tachypnea, or metabolic acidosis  - s/p [place], and on Bicarb gtt as rec by tox to ED team  - Will c/w Q2H Salicylates levels, serial blood gas, and BMPs until Salicylates downtrending.   - Admit to ICU -> transfer to medicine floor     Nausea and vomiting  - 2/2 ASA overdose, H.H unremarkable, patient denies melena, low suspicion for GI bleed  - Will continue to monitor for further nausea and manage conservatively.   - resolved    Suicide attempt by drug ingestion  - Once stabilizing ASA levels  - Psych consult and transfer to [facility] for inpatient management     Healthcare maintenance  - DVT ppx - Low risk, with toxic anti-platelet levels in system, no need for A/C  - Admit to ICU  -bicarb drip at 200 cc/hr, check bmp q 2 hrs, check abg  -f/u aspirin level q 2 hrs  -psych evaluation  -constant observation    Dispo - [facility] 22 yo M with no PMH p/w ASA ingestion took 30-50 pills baby ASA + Promethazine x1 bottle + Nyquil x1 bottle at 8 pm 7/23 (d/t lost job, car problem, child in 2013, postpartum depression not treated) p/w tinnitus and nausea.    Patient treated with charcoal and required a bicarb gtt.  Was evaluated by tox and asa levels downtrended and gtt dc.  Patients nausea/vomiting/tinnitus resolved.  Patient tolerating diet.  Labs wnl.  Hb slightly lower w/o GI symptoms + menses (also explains UA findings including + protein).    Patient will be transferred to [facility]

## 2018-07-25 NOTE — CHART NOTE - NSCHARTNOTEFT_GEN_A_CORE
23F with no PMH presenting after intentional toxic ingestion of 30-50 tablets of Aspirin, 1 bottle of Nyquil, and 1/2 promethazine on 7/23. States that she took these as she was feeling "overwhelmed" with stressors, including job hunting, car problems, and relationship stressors. One hour after ingestion, patient had multiple episodes of large volume non bilious non bloody vomiting and b/l tinnitus. Denies diarrhea, abd pain, melena, hematochezia. Denies dyspnea, headache, blurred vision. Denies fevers, chills, malaise. Patient denies EtOH abuse, recreational drug abuse. Denies previous episodes of overdose. Lives alone. Reports thoughts of self-harm at this time.    On review of charts, patient had reported a suicidal attempt at age of 10.Also on outpatient records, patient had caesarian delivery in 2013 with post-partum course c/w post-partum depression at which time recommendation was made to f/u with [facility] which patient has not done. She denies ever seeing a psychiatrist or having inpatient psychiatric treatment. Outpatient records also report h/o marijuana use.    In the ED patient received 1L LR, Zofran. She was admitted to the MICU for further monitoring. Toxicology was consulted and patient got serial ASA levels checked, charcoal, and NaHCO3 gtt. Salicylate levels have been downtrending, below 30, bicarb drip d/dustin. Patient was found to be hypokalemic, now resolved with potassium repletion.          23F with no PMH presenting after intentional toxic ingestion of Aspirin and promethazine, admitted to ICU for monitoring of ASA levels, now <30.       TO FOLLOW:   - continue to monitor ECG, timed for 6am  -salicylate levels downtrending, now below 30 (off bicarb gtt)  -follow up BMP in the AM  -f/u psych consult  -constant observation for now given SI/attempt    Yusef De Luna, PGY3  MAR

## 2018-07-25 NOTE — DISCHARGE NOTE ADULT - PLAN OF CARE
Prevent future occurrence You were admitted to the medical ICU upon hospital admission and monitored carefully - Your medical status has improved and you are medically stable for discharge to [facility] for further psychiatric management. Your medical status has improved and you are medically stable for discharge to [facility] for further psychiatric management. Your medical status has improved and you are medically stable for discharge to [facility] for further psychiatric management.

## 2018-07-25 NOTE — PROGRESS NOTE BEHAVIORAL HEALTH - NSBHFUPSUICINTERVALFT_PSY_A_CORE
Pt was admitted with an overdose and now says that she does not know how this even happened.  She denies any active plan to harm herself now.

## 2018-07-25 NOTE — CHART NOTE - NSCHARTNOTEFT_GEN_A_CORE
Patient is a 23F with no PMH presenting after intentional toxic ingestion of 30-50 tablets of Aspirin, 1 bottle of Nyquil, and 1/2 promethazine on 7/23. States that she took these as she was feeling "overwhelmed" with stressors, including job hunting, car problems, and relationship stressors. One hour after ingestion, patient had multiple episodes of large volume non bilious non bloody vomiting and b/l tinnitus. Denies diarrhea, abd pain, melena, hematochezia. Denies dyspnea, headache, blurred vision. Denies fevers, chills, malaise. Patient denies EtOH abuse, recreational drug abuse. Denies previous episodes of overdose. Lives alone. Reports thoughts of self-harm at this time.    On review of charts, patient had reported a suicidal attempt at age of 10.Also on outpatient records, patient had caesarian delivery in 2013 with post-partum course c/w post-partum depression at which time recommendation was made to f/u with [facility] which patient has not done. She denies ever seeing a psychiatrist or having inpatient psychiatric treatment. Outpatient records also report h/o marijuana use.    In the ED patient received 1L LR, Zofran. Toxicology was consulted and patient got serial ASA levels checked, charcoal, and NaHCO3 gtt. Salicylate levels have been downtrending, below 30, bicarb drip d/dustin. Patient was found to be hypokalemic, now resolved with potassium repletion.     Salicylate overdose  -s/p charcoal and bicarb gtt   -salicylate levels downtrending, now below 30  -follow up BMP in the AM  -appreciate toxicology recs  -f/u psych consult  -constant observation Patient is a 23F with no PMH presenting after intentional toxic ingestion of 30-50 tablets of Aspirin, 1 bottle of Nyquil, and 1/2 promethazine on 7/23. States that she took these as she was feeling "overwhelmed" with stressors, including job hunting, car problems, and relationship stressors. One hour after ingestion, patient had multiple episodes of large volume non bilious non bloody vomiting and b/l tinnitus. Denies diarrhea, abd pain, melena, hematochezia. Denies dyspnea, headache, blurred vision. Denies fevers, chills, malaise. Patient denies EtOH abuse, recreational drug abuse. Denies previous episodes of overdose. Lives alone. Reports thoughts of self-harm at this time.    On review of charts, patient had reported a suicidal attempt at age of 10.Also on outpatient records, patient had caesarian delivery in 2013 with post-partum course c/w post-partum depression at which time recommendation was made to f/u with [facility] which patient has not done. She denies ever seeing a psychiatrist or having inpatient psychiatric treatment. Outpatient records also report h/o marijuana use.    In the ED patient received 1L LR, Zofran. She was admitted to the MICU for further monitoring. Toxicology was consulted and patient got serial ASA levels checked, charcoal, and NaHCO3 gtt. Salicylate levels have been downtrending, below 30, bicarb drip d/dustin. Patient was found to be hypokalemic, now resolved with potassium repletion.         23F with no PMH presenting after intentional toxic ingestion of Aspirin and promethazine, admitted to ICU for monitoring of ASA levels, now <30.     # Salicylate overdose  -s/p charcoal and bicarb gtt   - continue to monitor ECG, timed for 6am  -salicylate levels downtrending, now below 30  -follow up BMP in the AM  -appreciate toxicology recs  -f/u psych consult  -constant observation

## 2018-07-25 NOTE — DISCHARGE NOTE ADULT - CARE PROVIDER_API CALL
PCP,   If you are in need of a general medicine physician and post-discharge medical follow-up for further care/recommendations you may contact the [facility] for an appointment [phone].  Phone: (   )    -  Fax: (   )    -    Psychiatric Resource,   If you are in need of immediate psychiatric assistance you may call the [facility] [phone]  Phone: (   )    -  Fax: (   )    -

## 2018-07-25 NOTE — PROGRESS NOTE ADULT - ASSESSMENT
22 yo F with no PMH presenting after intentional OD including ingestion of Aspirin in  admitted to ICU for monitoring of ASA levels s/p charcoal and bicarb gtt, now on floors, stable, tolerating diet, symptoms have resolved.      Problem/Plan - 1:  ·  Problem: Salicylate overdose.  Plan: Patient reports ingestion of "30-50" tabs, does not know if enteric coated or not. Ingestion at ~20:00 and with vomiting one hour later.  - Levels downtrended  - seen by [facility] who recommended dc of bicarb gtt, cleared        Problem/Plan - 2:  ·  Problem: Nausea and vomiting.  Plan: 2/2 ASA overdose, H/H slight down but patient with menses and s/p IVF, patient denies melena, low suspicion for GI bleed  - Will continue to monitor for further nausea and manage conservatively.  - Zofran for nausea, no vomiting since day of admission        Problem/Plan - 3:  ·  Problem: Suicide attempt by drug ingestion,  - psych c/s appreciated, for [facility] transfer        Problem/Plan - 4:  ·  Problem: Healthcare maintenance.  Plan: DVT ppx - Low risk, with toxic anti-platelet levels in system, no need for A/C  Diet - regular diet    Patient is medically stable, doing well, symptoms have resolved  Anticipate [facility] transfer today if bed available  Signout provided to hospitalsist  Dispo time 31 min

## 2018-07-25 NOTE — DISCHARGE NOTE ADULT - PROVIDER TOKENS
FREE:[LAST:[PCP],PHONE:[(   )    -],FAX:[(   )    -],ADDRESS:[If you are in need of a general medicine physician and post-discharge medical follow-up for further care/recommendations you may contact the [facility] for an appointment [phone].]],FREE:[LAST:[Psychiatric Resource],PHONE:[(   )    -],FAX:[(   )    -],ADDRESS:[If you are in need of immediate psychiatric assistance you may call the [facility] [phone]]]

## 2018-07-25 NOTE — PROGRESS NOTE BEHAVIORAL HEALTH - NSBHCHARTREVIEWVS_PSY_A_CORE FT
Vital Signs Last 24 Hrs  T(C): 36.8 (25 Jul 2018 14:35), Max: 36.9 (24 Jul 2018 16:00)  T(F): 98.3 (25 Jul 2018 14:35), Max: 98.5 (24 Jul 2018 16:00)  HR: 66 (25 Jul 2018 14:35) (56 - 75)  BP: 99/70 (25 Jul 2018 14:35) (88/54 - 115/70)  BP(mean): 66 (25 Jul 2018 02:00) (62 - 83)  RR: 17 (25 Jul 2018 14:35) (8 - 21)  SpO2: 100% (25 Jul 2018 14:35) (98% - 100%)

## 2018-07-26 PROCEDURE — 99232 SBSQ HOSP IP/OBS MODERATE 35: CPT

## 2018-07-26 RX ADMIN — ESCITALOPRAM OXALATE 5 MILLIGRAM(S): 10 TABLET, FILM COATED ORAL at 09:25

## 2018-07-26 RX ADMIN — Medication 3 MILLIGRAM(S): at 21:11

## 2018-07-26 RX ADMIN — Medication 1 MILLIGRAM(S): at 21:11

## 2018-07-26 RX ADMIN — Medication 1 MILLIGRAM(S): at 13:20

## 2018-07-27 PROCEDURE — 99232 SBSQ HOSP IP/OBS MODERATE 35: CPT | Mod: 25

## 2018-07-27 PROCEDURE — 90853 GROUP PSYCHOTHERAPY: CPT

## 2018-07-27 RX ADMIN — ESCITALOPRAM OXALATE 5 MILLIGRAM(S): 10 TABLET, FILM COATED ORAL at 08:40

## 2018-07-27 RX ADMIN — Medication 1 MILLIGRAM(S): at 20:50

## 2018-07-27 RX ADMIN — Medication 3 MILLIGRAM(S): at 20:50

## 2018-07-28 PROCEDURE — 99232 SBSQ HOSP IP/OBS MODERATE 35: CPT

## 2018-07-28 RX ORDER — ESCITALOPRAM OXALATE 10 MG/1
10 TABLET, FILM COATED ORAL DAILY
Qty: 0 | Refills: 0 | Status: DISCONTINUED | OUTPATIENT
Start: 2018-07-28 | End: 2018-08-02

## 2018-07-28 RX ADMIN — Medication 3 MILLIGRAM(S): at 22:16

## 2018-07-28 RX ADMIN — Medication 1 MILLIGRAM(S): at 22:16

## 2018-07-28 RX ADMIN — ESCITALOPRAM OXALATE 5 MILLIGRAM(S): 10 TABLET, FILM COATED ORAL at 08:24

## 2018-07-29 PROCEDURE — 99232 SBSQ HOSP IP/OBS MODERATE 35: CPT

## 2018-07-29 RX ADMIN — ESCITALOPRAM OXALATE 10 MILLIGRAM(S): 10 TABLET, FILM COATED ORAL at 08:42

## 2018-07-29 RX ADMIN — Medication 1 MILLIGRAM(S): at 21:01

## 2018-07-29 RX ADMIN — Medication 3 MILLIGRAM(S): at 21:30

## 2018-07-29 RX ADMIN — Medication 1 MILLIGRAM(S): at 09:39

## 2018-07-30 PROCEDURE — 99232 SBSQ HOSP IP/OBS MODERATE 35: CPT

## 2018-07-30 RX ADMIN — Medication 3 MILLIGRAM(S): at 21:26

## 2018-07-30 RX ADMIN — Medication 1 MILLIGRAM(S): at 21:26

## 2018-07-30 RX ADMIN — Medication 1 MILLIGRAM(S): at 08:26

## 2018-07-30 RX ADMIN — ESCITALOPRAM OXALATE 10 MILLIGRAM(S): 10 TABLET, FILM COATED ORAL at 08:26

## 2018-07-31 PROCEDURE — 99232 SBSQ HOSP IP/OBS MODERATE 35: CPT

## 2018-07-31 RX ORDER — LANOLIN ALCOHOL/MO/W.PET/CERES
3 CREAM (GRAM) TOPICAL ONCE
Qty: 0 | Refills: 0 | Status: COMPLETED | OUTPATIENT
Start: 2018-07-31 | End: 2018-07-31

## 2018-07-31 RX ADMIN — Medication 1 MILLIGRAM(S): at 09:03

## 2018-07-31 RX ADMIN — Medication 3 MILLIGRAM(S): at 22:52

## 2018-07-31 RX ADMIN — ESCITALOPRAM OXALATE 10 MILLIGRAM(S): 10 TABLET, FILM COATED ORAL at 09:03

## 2018-07-31 RX ADMIN — Medication 3 MILLIGRAM(S): at 21:14

## 2018-07-31 RX ADMIN — Medication 1 MILLIGRAM(S): at 21:14

## 2018-08-01 ENCOUNTER — OUTPATIENT (OUTPATIENT)
Dept: OUTPATIENT SERVICES | Facility: HOSPITAL | Age: 23
LOS: 1 days | End: 2018-08-01
Payer: MEDICAID

## 2018-08-01 DIAGNOSIS — Z98.891 HISTORY OF UTERINE SCAR FROM PREVIOUS SURGERY: Chronic | ICD-10-CM

## 2018-08-01 PROCEDURE — 99232 SBSQ HOSP IP/OBS MODERATE 35: CPT

## 2018-08-01 PROCEDURE — G9001: CPT

## 2018-08-01 RX ADMIN — ESCITALOPRAM OXALATE 10 MILLIGRAM(S): 10 TABLET, FILM COATED ORAL at 09:23

## 2018-08-01 RX ADMIN — Medication 1 MILLIGRAM(S): at 09:23

## 2018-08-01 RX ADMIN — Medication 3 MILLIGRAM(S): at 21:53

## 2018-08-01 RX ADMIN — Medication 1 MILLIGRAM(S): at 21:04

## 2018-08-02 PROCEDURE — 99232 SBSQ HOSP IP/OBS MODERATE 35: CPT

## 2018-08-02 RX ORDER — CLONAZEPAM 1 MG
0.5 TABLET ORAL
Qty: 0 | Refills: 0 | Status: DISCONTINUED | OUTPATIENT
Start: 2018-08-02 | End: 2018-08-06

## 2018-08-02 RX ORDER — ACETAMINOPHEN 500 MG
650 TABLET ORAL ONCE
Qty: 0 | Refills: 0 | Status: COMPLETED | OUTPATIENT
Start: 2018-08-02 | End: 2018-08-02

## 2018-08-02 RX ORDER — ESCITALOPRAM OXALATE 10 MG/1
15 TABLET, FILM COATED ORAL DAILY
Qty: 0 | Refills: 0 | Status: DISCONTINUED | OUTPATIENT
Start: 2018-08-03 | End: 2018-08-06

## 2018-08-02 RX ADMIN — Medication 0.5 MILLIGRAM(S): at 20:12

## 2018-08-02 RX ADMIN — Medication 1 MILLIGRAM(S): at 08:24

## 2018-08-02 RX ADMIN — Medication 3 MILLIGRAM(S): at 20:40

## 2018-08-02 RX ADMIN — ESCITALOPRAM OXALATE 10 MILLIGRAM(S): 10 TABLET, FILM COATED ORAL at 08:24

## 2018-08-02 RX ADMIN — Medication 650 MILLIGRAM(S): at 12:17

## 2018-08-03 PROCEDURE — 90853 GROUP PSYCHOTHERAPY: CPT

## 2018-08-03 PROCEDURE — 99232 SBSQ HOSP IP/OBS MODERATE 35: CPT | Mod: 25

## 2018-08-03 RX ORDER — LANOLIN ALCOHOL/MO/W.PET/CERES
1 CREAM (GRAM) TOPICAL
Qty: 14 | Refills: 0 | OUTPATIENT
Start: 2018-08-03 | End: 2018-08-16

## 2018-08-03 RX ORDER — ESCITALOPRAM OXALATE 10 MG/1
3 TABLET, FILM COATED ORAL
Qty: 42 | Refills: 0 | OUTPATIENT
Start: 2018-08-03 | End: 2018-08-16

## 2018-08-03 RX ORDER — CLONAZEPAM 1 MG
1 TABLET ORAL
Qty: 28 | Refills: 0 | OUTPATIENT
Start: 2018-08-03 | End: 2018-08-16

## 2018-08-03 RX ADMIN — Medication 3 MILLIGRAM(S): at 21:28

## 2018-08-03 RX ADMIN — Medication 0.5 MILLIGRAM(S): at 09:48

## 2018-08-03 RX ADMIN — Medication 0.5 MILLIGRAM(S): at 21:28

## 2018-08-03 RX ADMIN — ESCITALOPRAM OXALATE 15 MILLIGRAM(S): 10 TABLET, FILM COATED ORAL at 09:48

## 2018-08-04 RX ADMIN — Medication 0.5 MILLIGRAM(S): at 09:54

## 2018-08-04 RX ADMIN — Medication 0.5 MILLIGRAM(S): at 21:03

## 2018-08-04 RX ADMIN — ESCITALOPRAM OXALATE 15 MILLIGRAM(S): 10 TABLET, FILM COATED ORAL at 09:54

## 2018-08-04 RX ADMIN — Medication 3 MILLIGRAM(S): at 21:04

## 2018-08-05 RX ADMIN — ESCITALOPRAM OXALATE 15 MILLIGRAM(S): 10 TABLET, FILM COATED ORAL at 09:01

## 2018-08-05 RX ADMIN — Medication 0.5 MILLIGRAM(S): at 21:19

## 2018-08-05 RX ADMIN — Medication 0.5 MILLIGRAM(S): at 09:01

## 2018-08-05 RX ADMIN — Medication 3 MILLIGRAM(S): at 21:19

## 2018-08-06 VITALS — TEMPERATURE: 98 F | RESPIRATION RATE: 18 BRPM

## 2018-08-06 PROCEDURE — 99239 HOSP IP/OBS DSCHRG MGMT >30: CPT

## 2018-08-06 RX ORDER — ESCITALOPRAM OXALATE 10 MG/1
5 TABLET, FILM COATED ORAL
Qty: 140 | Refills: 0
Start: 2018-08-06 | End: 2018-09-02

## 2018-08-06 RX ORDER — LANOLIN ALCOHOL/MO/W.PET/CERES
1 CREAM (GRAM) TOPICAL
Qty: 28 | Refills: 0
Start: 2018-08-06 | End: 2018-09-02

## 2018-08-06 RX ORDER — ESCITALOPRAM OXALATE 10 MG/1
3 TABLET, FILM COATED ORAL
Qty: 84 | Refills: 0 | OUTPATIENT
Start: 2018-08-06 | End: 2018-09-02

## 2018-08-06 RX ORDER — ESCITALOPRAM OXALATE 10 MG/1
1 TABLET, FILM COATED ORAL
Qty: 28 | Refills: 0
Start: 2018-08-06 | End: 2018-09-02

## 2018-08-06 RX ORDER — CLONAZEPAM 1 MG
1 TABLET ORAL
Qty: 56 | Refills: 0
Start: 2018-08-06 | End: 2018-09-02

## 2018-08-06 RX ORDER — CLONAZEPAM 1 MG
1 TABLET ORAL
Qty: 28 | Refills: 0 | OUTPATIENT
Start: 2018-08-06 | End: 2018-08-19

## 2018-08-06 RX ADMIN — Medication 0.5 MILLIGRAM(S): at 08:20

## 2018-08-06 RX ADMIN — ESCITALOPRAM OXALATE 15 MILLIGRAM(S): 10 TABLET, FILM COATED ORAL at 08:20

## 2018-08-17 DIAGNOSIS — Z71.89 OTHER SPECIFIED COUNSELING: ICD-10-CM

## 2020-09-30 ENCOUNTER — NON-APPOINTMENT (OUTPATIENT)
Age: 25
End: 2020-09-30

## 2020-09-30 ENCOUNTER — LABORATORY RESULT (OUTPATIENT)
Age: 25
End: 2020-09-30

## 2020-09-30 ENCOUNTER — RESULT REVIEW (OUTPATIENT)
Age: 25
End: 2020-09-30

## 2020-09-30 ENCOUNTER — APPOINTMENT (OUTPATIENT)
Dept: OBGYN | Facility: HOSPITAL | Age: 25
End: 2020-09-30

## 2020-09-30 ENCOUNTER — TRANSCRIPTION ENCOUNTER (OUTPATIENT)
Age: 25
End: 2020-09-30

## 2020-09-30 ENCOUNTER — OUTPATIENT (OUTPATIENT)
Dept: OUTPATIENT SERVICES | Facility: HOSPITAL | Age: 25
LOS: 1 days | End: 2020-09-30
Payer: MEDICAID

## 2020-09-30 ENCOUNTER — MED ADMIN CHARGE (OUTPATIENT)
Age: 25
End: 2020-09-30

## 2020-09-30 VITALS
SYSTOLIC BLOOD PRESSURE: 104 MMHG | WEIGHT: 114 LBS | DIASTOLIC BLOOD PRESSURE: 71 MMHG | HEIGHT: 64 IN | HEART RATE: 90 BPM | BODY MASS INDEX: 19.46 KG/M2 | TEMPERATURE: 97.9 F

## 2020-09-30 DIAGNOSIS — Z98.891 HISTORY OF UTERINE SCAR FROM PREVIOUS SURGERY: Chronic | ICD-10-CM

## 2020-09-30 DIAGNOSIS — Z87.898 PERSONAL HISTORY OF OTHER SPECIFIED CONDITIONS: ICD-10-CM

## 2020-09-30 DIAGNOSIS — Z86.19 PERSONAL HISTORY OF OTHER INFECTIOUS AND PARASITIC DISEASES: ICD-10-CM

## 2020-09-30 DIAGNOSIS — Z34.90 ENCOUNTER FOR SUPERVISION OF NORMAL PREGNANCY, UNSPECIFIED, UNSPECIFIED TRIMESTER: ICD-10-CM

## 2020-09-30 DIAGNOSIS — A56.09 OTHER CHLAMYDIAL INFECTION OF LOWER GENITOURINARY TRACT: ICD-10-CM

## 2020-09-30 DIAGNOSIS — Z78.9 OTHER SPECIFIED HEALTH STATUS: ICD-10-CM

## 2020-09-30 LAB
24R-OH-CALCIDIOL SERPL-MCNC: 31.7 NG/ML — SIGNIFICANT CHANGE UP (ref 30–80)
ALBUMIN SERPL ELPH-MCNC: 4.8 G/DL — SIGNIFICANT CHANGE UP (ref 3.3–5)
ALP SERPL-CCNC: 39 U/L — LOW (ref 40–120)
ALT FLD-CCNC: 8 U/L — SIGNIFICANT CHANGE UP (ref 4–33)
AMPHET UR-MCNC: NEGATIVE — SIGNIFICANT CHANGE UP
ANION GAP SERPL CALC-SCNC: 13 MMO/L — SIGNIFICANT CHANGE UP (ref 7–14)
APPEARANCE UR: SIGNIFICANT CHANGE UP
AST SERPL-CCNC: 15 U/L — SIGNIFICANT CHANGE UP (ref 4–32)
BACTERIA # UR AUTO: HIGH
BARBITURATES UR SCN-MCNC: NEGATIVE — SIGNIFICANT CHANGE UP
BASOPHILS # BLD AUTO: 0.02 K/UL — SIGNIFICANT CHANGE UP (ref 0–0.2)
BASOPHILS NFR BLD AUTO: 0.3 % — SIGNIFICANT CHANGE UP (ref 0–2)
BENZODIAZ UR-MCNC: NEGATIVE — SIGNIFICANT CHANGE UP
BILIRUB SERPL-MCNC: 0.3 MG/DL — SIGNIFICANT CHANGE UP (ref 0.2–1.2)
BILIRUB UR-MCNC: NEGATIVE — SIGNIFICANT CHANGE UP
BLD GP AB SCN SERPL QL: NEGATIVE — SIGNIFICANT CHANGE UP
BLOOD UR QL VISUAL: NEGATIVE — SIGNIFICANT CHANGE UP
BUN SERPL-MCNC: 5 MG/DL — LOW (ref 7–23)
CALCIUM SERPL-MCNC: 9.9 MG/DL — SIGNIFICANT CHANGE UP (ref 8.4–10.5)
CANNABINOIDS UR-MCNC: NEGATIVE — SIGNIFICANT CHANGE UP
CHLORIDE SERPL-SCNC: 100 MMOL/L — SIGNIFICANT CHANGE UP (ref 98–107)
CO2 SERPL-SCNC: 22 MMOL/L — SIGNIFICANT CHANGE UP (ref 22–31)
COCAINE METAB.OTHER UR-MCNC: NEGATIVE — SIGNIFICANT CHANGE UP
COLOR SPEC: YELLOW — SIGNIFICANT CHANGE UP
CREAT SERPL-MCNC: 0.48 MG/DL — LOW (ref 0.5–1.3)
EOSINOPHIL # BLD AUTO: 0.07 K/UL — SIGNIFICANT CHANGE UP (ref 0–0.5)
EOSINOPHIL NFR BLD AUTO: 0.9 % — SIGNIFICANT CHANGE UP (ref 0–6)
GLUCOSE SERPL-MCNC: 80 MG/DL — SIGNIFICANT CHANGE UP (ref 70–99)
GLUCOSE UR-MCNC: NEGATIVE — SIGNIFICANT CHANGE UP
HBA1C BLD-MCNC: 5.2 % — SIGNIFICANT CHANGE UP (ref 4–5.6)
HBV SURFACE AG SER-ACNC: NONREACTIVE — SIGNIFICANT CHANGE UP
HCT VFR BLD CALC: 36.9 % — SIGNIFICANT CHANGE UP (ref 34.5–45)
HCV AB S/CO SERPL IA: 0.14 S/CO — SIGNIFICANT CHANGE UP (ref 0–0.99)
HCV AB SERPL-IMP: SIGNIFICANT CHANGE UP
HGB A MFR BLD: 96.2 % — SIGNIFICANT CHANGE UP
HGB A2 MFR BLD: 3 % — SIGNIFICANT CHANGE UP (ref 2.4–3.5)
HGB BLD-MCNC: 12.4 G/DL — SIGNIFICANT CHANGE UP (ref 11.5–15.5)
HGB ELECT COMMENT: SIGNIFICANT CHANGE UP
HGB F MFR BLD: < 1 % — SIGNIFICANT CHANGE UP (ref 0–1.5)
HIV 1+2 AB+HIV1 P24 AG SERPL QL IA: SIGNIFICANT CHANGE UP
IMM GRANULOCYTES NFR BLD AUTO: 0.4 % — SIGNIFICANT CHANGE UP (ref 0–1.5)
KETONES UR-MCNC: NEGATIVE — SIGNIFICANT CHANGE UP
LEUKOCYTE ESTERASE UR-ACNC: NEGATIVE — SIGNIFICANT CHANGE UP
LYMPHOCYTES # BLD AUTO: 1.62 K/UL — SIGNIFICANT CHANGE UP (ref 1–3.3)
LYMPHOCYTES # BLD AUTO: 21.8 % — SIGNIFICANT CHANGE UP (ref 13–44)
MCHC RBC-ENTMCNC: 26.7 PG — LOW (ref 27–34)
MCHC RBC-ENTMCNC: 33.6 % — SIGNIFICANT CHANGE UP (ref 32–36)
MCV RBC AUTO: 79.5 FL — LOW (ref 80–100)
METHADONE UR-MCNC: NEGATIVE — SIGNIFICANT CHANGE UP
MONOCYTES # BLD AUTO: 0.46 K/UL — SIGNIFICANT CHANGE UP (ref 0–0.9)
MONOCYTES NFR BLD AUTO: 6.2 % — SIGNIFICANT CHANGE UP (ref 2–14)
NEUTROPHILS # BLD AUTO: 5.23 K/UL — SIGNIFICANT CHANGE UP (ref 1.8–7.4)
NEUTROPHILS NFR BLD AUTO: 70.4 % — SIGNIFICANT CHANGE UP (ref 43–77)
NITRITE UR-MCNC: NEGATIVE — SIGNIFICANT CHANGE UP
NRBC # FLD: 0 K/UL — SIGNIFICANT CHANGE UP (ref 0–0)
OPIATES UR-MCNC: NEGATIVE — SIGNIFICANT CHANGE UP
OXYCODONE UR-MCNC: NEGATIVE — SIGNIFICANT CHANGE UP
PCP UR-MCNC: NEGATIVE — SIGNIFICANT CHANGE UP
PH UR: 7 — SIGNIFICANT CHANGE UP (ref 5–8)
PLATELET # BLD AUTO: 273 K/UL — SIGNIFICANT CHANGE UP (ref 150–400)
PMV BLD: 9.9 FL — SIGNIFICANT CHANGE UP (ref 7–13)
POTASSIUM SERPL-MCNC: 3.7 MMOL/L — SIGNIFICANT CHANGE UP (ref 3.5–5.3)
POTASSIUM SERPL-SCNC: 3.7 MMOL/L — SIGNIFICANT CHANGE UP (ref 3.5–5.3)
PROT SERPL-MCNC: 8.4 G/DL — HIGH (ref 6–8.3)
PROT UR-MCNC: 10 — SIGNIFICANT CHANGE UP
RBC # BLD: 4.64 M/UL — SIGNIFICANT CHANGE UP (ref 3.8–5.2)
RBC # FLD: 14.4 % — SIGNIFICANT CHANGE UP (ref 10.3–14.5)
RBC CASTS # UR COMP ASSIST: SIGNIFICANT CHANGE UP (ref 0–?)
RH IG SCN BLD-IMP: NEGATIVE — SIGNIFICANT CHANGE UP
SARS-COV-2 IGG SERPL QL IA: POSITIVE
SARS-COV-2 IGM SERPL IA-ACNC: 2.4 INDEX — HIGH
SODIUM SERPL-SCNC: 135 MMOL/L — SIGNIFICANT CHANGE UP (ref 135–145)
SP GR SPEC: 1.02 — SIGNIFICANT CHANGE UP (ref 1–1.04)
SQUAMOUS # UR AUTO: SIGNIFICANT CHANGE UP
URATE SERPL-MCNC: 2.7 MG/DL — SIGNIFICANT CHANGE UP (ref 2.5–7)
UROBILINOGEN FLD QL: NORMAL — SIGNIFICANT CHANGE UP
WBC # BLD: 7.43 K/UL — SIGNIFICANT CHANGE UP (ref 3.8–10.5)
WBC # FLD AUTO: 7.43 K/UL — SIGNIFICANT CHANGE UP (ref 3.8–10.5)
WBC UR QL: SIGNIFICANT CHANGE UP (ref 0–?)

## 2020-09-30 PROCEDURE — G0452: CPT

## 2020-09-30 RX ORDER — PRENATAL VIT 49/IRON FUM/FOLIC 6.75-0.2MG
TABLET ORAL
Refills: 0 | Status: ACTIVE | COMMUNITY

## 2020-10-01 LAB
CULTURE RESULTS: NO GROWTH — SIGNIFICANT CHANGE UP
GAMMA INTERFERON BACKGROUND BLD IA-ACNC: 0.02 IU/ML — SIGNIFICANT CHANGE UP
LEAD SERPL-MCNC: < 1 UG/DL — SIGNIFICANT CHANGE UP (ref 0–4)
M TB IFN-G BLD-IMP: NEGATIVE — SIGNIFICANT CHANGE UP
M TB IFN-G CD4+ BCKGRND COR BLD-ACNC: 0.01 IU/ML — SIGNIFICANT CHANGE UP
M TB IFN-G CD4+CD8+ BCKGRND COR BLD-ACNC: 0.01 IU/ML — SIGNIFICANT CHANGE UP
MEV IGG SER-ACNC: 51.9 AU/ML — SIGNIFICANT CHANGE UP
MEV IGG+IGM SER-IMP: POSITIVE — SIGNIFICANT CHANGE UP
QUANT TB PLUS MITOGEN MINUS NIL: 6.89 IU/ML — SIGNIFICANT CHANGE UP
RUBV IGG SER-ACNC: 32.1 INDEX — SIGNIFICANT CHANGE UP
RUBV IGG SER-IMP: POSITIVE — SIGNIFICANT CHANGE UP
SPECIMEN SOURCE: SIGNIFICANT CHANGE UP
T PALLIDUM AB TITR SER: NEGATIVE — SIGNIFICANT CHANGE UP
VZV IGG FLD QL IA: 2181 INDEX — SIGNIFICANT CHANGE UP
VZV IGG FLD QL IA: POSITIVE — SIGNIFICANT CHANGE UP

## 2020-10-05 DIAGNOSIS — Z34.90 ENCOUNTER FOR SUPERVISION OF NORMAL PREGNANCY, UNSPECIFIED, UNSPECIFIED TRIMESTER: ICD-10-CM

## 2020-10-05 DIAGNOSIS — Z23 ENCOUNTER FOR IMMUNIZATION: ICD-10-CM

## 2020-10-05 LAB — CYTOLOGY SPEC DOC CYTO: SIGNIFICANT CHANGE UP

## 2020-10-08 ENCOUNTER — LABORATORY RESULT (OUTPATIENT)
Age: 25
End: 2020-10-08

## 2020-10-08 ENCOUNTER — ASOB RESULT (OUTPATIENT)
Age: 25
End: 2020-10-08

## 2020-10-08 ENCOUNTER — APPOINTMENT (OUTPATIENT)
Dept: ANTEPARTUM | Facility: CLINIC | Age: 25
End: 2020-10-08
Payer: MEDICAID

## 2020-10-08 DIAGNOSIS — R51.9 HEADACHE, UNSPECIFIED: ICD-10-CM

## 2020-10-08 LAB — CFTR MUT ANL BLD/T: NEGATIVE — SIGNIFICANT CHANGE UP

## 2020-10-08 PROCEDURE — 76813 OB US NUCHAL MEAS 1 GEST: CPT | Mod: 26

## 2020-10-08 PROCEDURE — 76801 OB US < 14 WKS SINGLE FETUS: CPT | Mod: 26

## 2020-10-08 PROCEDURE — 36416 COLLJ CAPILLARY BLOOD SPEC: CPT

## 2020-10-12 LAB
1ST TRIMESTER DATA: SIGNIFICANT CHANGE UP
ADDENDUM DOC: SIGNIFICANT CHANGE UP
AFP SERPL-ACNC: SIGNIFICANT CHANGE UP
B-HCG FREE SERPL-MCNC: SIGNIFICANT CHANGE UP
CLINICAL BIOCHEMIST REVIEW: SIGNIFICANT CHANGE UP
CLINICAL BIOCHEMIST REVIEW: SIGNIFICANT CHANGE UP
DEMOGRAPHIC DATA: SIGNIFICANT CHANGE UP
NASAL BONE: PRESENT — SIGNIFICANT CHANGE UP
NT: SIGNIFICANT CHANGE UP
PAPP-A SERPL-ACNC: SIGNIFICANT CHANGE UP
SCREEN-FOOTER: SIGNIFICANT CHANGE UP
SCREEN-RECOMMENDATIONS: SIGNIFICANT CHANGE UP

## 2020-10-21 ENCOUNTER — OUTPATIENT (OUTPATIENT)
Dept: OUTPATIENT SERVICES | Facility: HOSPITAL | Age: 25
LOS: 1 days | End: 2020-10-21

## 2020-10-21 ENCOUNTER — APPOINTMENT (OUTPATIENT)
Dept: OBGYN | Facility: HOSPITAL | Age: 25
End: 2020-10-21

## 2020-10-21 DIAGNOSIS — Z34.02 ENCOUNTER FOR SUPERVISION OF NORMAL FIRST PREGNANCY, SECOND TRIMESTER: ICD-10-CM

## 2020-10-21 DIAGNOSIS — Z98.891 HISTORY OF UTERINE SCAR FROM PREVIOUS SURGERY: Chronic | ICD-10-CM

## 2020-11-11 ENCOUNTER — APPOINTMENT (OUTPATIENT)
Dept: OBGYN | Facility: HOSPITAL | Age: 25
End: 2020-11-11

## 2020-11-13 ENCOUNTER — LABORATORY RESULT (OUTPATIENT)
Age: 25
End: 2020-11-13

## 2020-11-13 ENCOUNTER — OUTPATIENT (OUTPATIENT)
Dept: OUTPATIENT SERVICES | Facility: HOSPITAL | Age: 25
LOS: 1 days | End: 2020-11-13

## 2020-11-13 ENCOUNTER — APPOINTMENT (OUTPATIENT)
Dept: OBGYN | Facility: HOSPITAL | Age: 25
End: 2020-11-13

## 2020-11-13 ENCOUNTER — NON-APPOINTMENT (OUTPATIENT)
Age: 25
End: 2020-11-13

## 2020-11-13 ENCOUNTER — APPOINTMENT (OUTPATIENT)
Dept: ANTEPARTUM | Facility: CLINIC | Age: 25
End: 2020-11-13
Payer: MEDICAID

## 2020-11-13 ENCOUNTER — ASOB RESULT (OUTPATIENT)
Age: 25
End: 2020-11-13

## 2020-11-13 VITALS
HEART RATE: 92 BPM | TEMPERATURE: 98.2 F | DIASTOLIC BLOOD PRESSURE: 62 MMHG | BODY MASS INDEX: 21.46 KG/M2 | SYSTOLIC BLOOD PRESSURE: 111 MMHG | WEIGHT: 125 LBS

## 2020-11-13 DIAGNOSIS — Z98.891 HISTORY OF UTERINE SCAR FROM PREVIOUS SURGERY: Chronic | ICD-10-CM

## 2020-11-13 PROBLEM — R51.9 NEW ONSET HEADACHE: Status: ACTIVE | Noted: 2020-11-13

## 2020-11-13 PROCEDURE — 76811 OB US DETAILED SNGL FETUS: CPT | Mod: 26

## 2020-11-16 DIAGNOSIS — R51.9 HEADACHE, UNSPECIFIED: ICD-10-CM

## 2020-11-16 DIAGNOSIS — Z34.92 ENCOUNTER FOR SUPERVISION OF NORMAL PREGNANCY, UNSPECIFIED, SECOND TRIMESTER: ICD-10-CM

## 2020-11-18 LAB
1ST TRIMESTER DATA: SIGNIFICANT CHANGE UP
2ND TRIMESTER DATA: SIGNIFICANT CHANGE UP
AFP SERPL-ACNC: SIGNIFICANT CHANGE UP
AFP SERPL-ACNC: SIGNIFICANT CHANGE UP
B-HCG FREE SERPL-MCNC: SIGNIFICANT CHANGE UP
B-HCG FREE SERPL-MCNC: SIGNIFICANT CHANGE UP
CLINICAL BIOCHEMIST REVIEW: SIGNIFICANT CHANGE UP
DEMOGRAPHIC DATA: SIGNIFICANT CHANGE UP
INHIBIN A SERPL-MCNC: SIGNIFICANT CHANGE UP
NASAL BONE: PRESENT — SIGNIFICANT CHANGE UP
NT: SIGNIFICANT CHANGE UP
PAPP-A SERPL-ACNC: SIGNIFICANT CHANGE UP
SCREEN-FOOTER: SIGNIFICANT CHANGE UP
U ESTRIOL SERPL-SCNC: SIGNIFICANT CHANGE UP

## 2020-12-11 ENCOUNTER — APPOINTMENT (OUTPATIENT)
Dept: OBGYN | Facility: HOSPITAL | Age: 25
End: 2020-12-11

## 2020-12-15 ENCOUNTER — APPOINTMENT (OUTPATIENT)
Dept: OBGYN | Facility: HOSPITAL | Age: 25
End: 2020-12-15

## 2020-12-15 ENCOUNTER — OUTPATIENT (OUTPATIENT)
Dept: OUTPATIENT SERVICES | Facility: HOSPITAL | Age: 25
LOS: 1 days | End: 2020-12-15

## 2020-12-15 ENCOUNTER — NON-APPOINTMENT (OUTPATIENT)
Age: 25
End: 2020-12-15

## 2020-12-15 VITALS
SYSTOLIC BLOOD PRESSURE: 118 MMHG | HEART RATE: 103 BPM | TEMPERATURE: 98.2 F | DIASTOLIC BLOOD PRESSURE: 64 MMHG | BODY MASS INDEX: 21.97 KG/M2 | WEIGHT: 128 LBS

## 2020-12-15 DIAGNOSIS — Z98.891 HISTORY OF UTERINE SCAR FROM PREVIOUS SURGERY: Chronic | ICD-10-CM

## 2020-12-21 DIAGNOSIS — Z00.00 ENCOUNTER FOR GENERAL ADULT MEDICAL EXAMINATION WITHOUT ABNORMAL FINDINGS: ICD-10-CM

## 2021-01-08 ENCOUNTER — APPOINTMENT (OUTPATIENT)
Dept: ANTEPARTUM | Facility: CLINIC | Age: 26
End: 2021-01-08
Payer: MEDICAID

## 2021-01-08 ENCOUNTER — ASOB RESULT (OUTPATIENT)
Age: 26
End: 2021-01-08

## 2021-01-08 PROCEDURE — 76816 OB US FOLLOW-UP PER FETUS: CPT | Mod: 26

## 2021-01-12 ENCOUNTER — APPOINTMENT (OUTPATIENT)
Dept: OBGYN | Facility: HOSPITAL | Age: 26
End: 2021-01-12

## 2021-01-12 ENCOUNTER — NON-APPOINTMENT (OUTPATIENT)
Age: 26
End: 2021-01-12

## 2021-01-12 ENCOUNTER — OUTPATIENT (OUTPATIENT)
Dept: OUTPATIENT SERVICES | Facility: HOSPITAL | Age: 26
LOS: 1 days | End: 2021-01-12

## 2021-01-12 VITALS
TEMPERATURE: 97.9 F | BODY MASS INDEX: 23.17 KG/M2 | SYSTOLIC BLOOD PRESSURE: 94 MMHG | WEIGHT: 135 LBS | HEART RATE: 96 BPM | DIASTOLIC BLOOD PRESSURE: 73 MMHG

## 2021-01-12 DIAGNOSIS — Z98.891 HISTORY OF UTERINE SCAR FROM PREVIOUS SURGERY: Chronic | ICD-10-CM

## 2021-01-13 DIAGNOSIS — Z34.92 ENCOUNTER FOR SUPERVISION OF NORMAL PREGNANCY, UNSPECIFIED, SECOND TRIMESTER: ICD-10-CM

## 2021-01-26 ENCOUNTER — OUTPATIENT (OUTPATIENT)
Dept: OUTPATIENT SERVICES | Facility: HOSPITAL | Age: 26
LOS: 1 days | End: 2021-01-26

## 2021-01-26 ENCOUNTER — RESULT REVIEW (OUTPATIENT)
Age: 26
End: 2021-01-26

## 2021-01-26 ENCOUNTER — MED ADMIN CHARGE (OUTPATIENT)
Age: 26
End: 2021-01-26

## 2021-01-26 ENCOUNTER — NON-APPOINTMENT (OUTPATIENT)
Age: 26
End: 2021-01-26

## 2021-01-26 ENCOUNTER — APPOINTMENT (OUTPATIENT)
Dept: OBGYN | Facility: HOSPITAL | Age: 26
End: 2021-01-26

## 2021-01-26 VITALS
SYSTOLIC BLOOD PRESSURE: 125 MMHG | WEIGHT: 137 LBS | HEIGHT: 64 IN | TEMPERATURE: 98.1 F | DIASTOLIC BLOOD PRESSURE: 71 MMHG | BODY MASS INDEX: 23.39 KG/M2 | HEART RATE: 106 BPM

## 2021-01-26 DIAGNOSIS — Z98.891 HISTORY OF UTERINE SCAR FROM PREVIOUS SURGERY: Chronic | ICD-10-CM

## 2021-01-26 DIAGNOSIS — Z23 ENCOUNTER FOR IMMUNIZATION: ICD-10-CM

## 2021-01-26 DIAGNOSIS — Z00.00 ENCOUNTER FOR GENERAL ADULT MEDICAL EXAMINATION W/OUT ABNORMAL FINDINGS: ICD-10-CM

## 2021-01-26 LAB
24R-OH-CALCIDIOL SERPL-MCNC: 29.5 NG/ML — LOW (ref 30–80)
BASOPHILS # BLD AUTO: 0.02 K/UL — SIGNIFICANT CHANGE UP (ref 0–0.2)
BASOPHILS NFR BLD AUTO: 0.3 % — SIGNIFICANT CHANGE UP (ref 0–2)
EOSINOPHIL # BLD AUTO: 0.07 K/UL — SIGNIFICANT CHANGE UP (ref 0–0.5)
EOSINOPHIL NFR BLD AUTO: 0.9 % — SIGNIFICANT CHANGE UP (ref 0–6)
GLUCOSE 1H P MEAL SERPL-MCNC: 79 MG/DL — SIGNIFICANT CHANGE UP (ref 70–134)
HCT VFR BLD CALC: 32.4 % — LOW (ref 34.5–45)
HGB BLD-MCNC: 10.6 G/DL — LOW (ref 11.5–15.5)
IANC: 5.72 K/UL — SIGNIFICANT CHANGE UP (ref 1.5–8.5)
IMM GRANULOCYTES NFR BLD AUTO: 0.4 % — SIGNIFICANT CHANGE UP (ref 0–1.5)
LYMPHOCYTES # BLD AUTO: 1.33 K/UL — SIGNIFICANT CHANGE UP (ref 1–3.3)
LYMPHOCYTES # BLD AUTO: 17.2 % — SIGNIFICANT CHANGE UP (ref 13–44)
MCHC RBC-ENTMCNC: 28.8 PG — SIGNIFICANT CHANGE UP (ref 27–34)
MCHC RBC-ENTMCNC: 32.7 GM/DL — SIGNIFICANT CHANGE UP (ref 32–36)
MCV RBC AUTO: 88 FL — SIGNIFICANT CHANGE UP (ref 80–100)
MONOCYTES # BLD AUTO: 0.56 K/UL — SIGNIFICANT CHANGE UP (ref 0–0.9)
MONOCYTES NFR BLD AUTO: 7.2 % — SIGNIFICANT CHANGE UP (ref 2–14)
NEUTROPHILS # BLD AUTO: 5.72 K/UL — SIGNIFICANT CHANGE UP (ref 1.8–7.4)
NEUTROPHILS NFR BLD AUTO: 74 % — SIGNIFICANT CHANGE UP (ref 43–77)
NRBC # BLD: 0 /100 WBCS — SIGNIFICANT CHANGE UP
NRBC # FLD: 0 K/UL — SIGNIFICANT CHANGE UP
PLATELET # BLD AUTO: 195 K/UL — SIGNIFICANT CHANGE UP (ref 150–400)
RBC # BLD: 3.68 M/UL — LOW (ref 3.8–5.2)
RBC # FLD: 13.2 % — SIGNIFICANT CHANGE UP (ref 10.3–14.5)
WBC # BLD: 7.73 K/UL — SIGNIFICANT CHANGE UP (ref 3.8–10.5)
WBC # FLD AUTO: 7.73 K/UL — SIGNIFICANT CHANGE UP (ref 3.8–10.5)

## 2021-01-26 RX ORDER — FERROUS SULFATE 325(65) MG
325 (65 FE) TABLET ORAL
Qty: 60 | Refills: 2 | Status: ACTIVE | COMMUNITY
Start: 2021-01-26 | End: 1900-01-01

## 2021-01-26 RX ORDER — HUMAN RHO(D) IMMUNE GLOBULIN 300 UG/1
1500 INJECTION, SOLUTION INTRAMUSCULAR
Qty: 0 | Refills: 0 | Status: COMPLETED | OUTPATIENT
Start: 2021-01-26

## 2021-01-26 RX ADMIN — HUMAN RHO(D) IMMUNE GLOBULIN 0 UNIT: 300 INJECTION, SOLUTION INTRAMUSCULAR at 00:00

## 2021-01-27 LAB — T PALLIDUM AB TITR SER: NEGATIVE — SIGNIFICANT CHANGE UP

## 2021-01-28 DIAGNOSIS — Z34.83 ENCOUNTER FOR SUPERVISION OF OTHER NORMAL PREGNANCY, THIRD TRIMESTER: ICD-10-CM

## 2021-01-28 DIAGNOSIS — Z23 ENCOUNTER FOR IMMUNIZATION: ICD-10-CM

## 2021-02-26 ENCOUNTER — OUTPATIENT (OUTPATIENT)
Dept: OUTPATIENT SERVICES | Facility: HOSPITAL | Age: 26
LOS: 1 days | End: 2021-02-26

## 2021-02-26 ENCOUNTER — APPOINTMENT (OUTPATIENT)
Dept: ANTEPARTUM | Facility: CLINIC | Age: 26
End: 2021-02-26
Payer: MEDICAID

## 2021-02-26 ENCOUNTER — ASOB RESULT (OUTPATIENT)
Age: 26
End: 2021-02-26

## 2021-02-26 ENCOUNTER — APPOINTMENT (OUTPATIENT)
Dept: OBGYN | Facility: HOSPITAL | Age: 26
End: 2021-02-26

## 2021-02-26 ENCOUNTER — NON-APPOINTMENT (OUTPATIENT)
Age: 26
End: 2021-02-26

## 2021-02-26 VITALS
WEIGHT: 143 LBS | HEIGHT: 64 IN | BODY MASS INDEX: 24.41 KG/M2 | DIASTOLIC BLOOD PRESSURE: 57 MMHG | TEMPERATURE: 98.1 F | SYSTOLIC BLOOD PRESSURE: 110 MMHG | HEART RATE: 10 BPM

## 2021-02-26 DIAGNOSIS — Z98.891 HISTORY OF UTERINE SCAR FROM PREVIOUS SURGERY: Chronic | ICD-10-CM

## 2021-02-26 PROCEDURE — 76819 FETAL BIOPHYS PROFIL W/O NST: CPT | Mod: 26

## 2021-02-26 PROCEDURE — 76816 OB US FOLLOW-UP PER FETUS: CPT | Mod: 26

## 2021-03-01 DIAGNOSIS — Z33.1 PREGNANT STATE, INCIDENTAL: ICD-10-CM

## 2021-03-19 ENCOUNTER — OUTPATIENT (OUTPATIENT)
Dept: OUTPATIENT SERVICES | Facility: HOSPITAL | Age: 26
LOS: 1 days | End: 2021-03-19

## 2021-03-19 ENCOUNTER — APPOINTMENT (OUTPATIENT)
Dept: OBGYN | Facility: HOSPITAL | Age: 26
End: 2021-03-19

## 2021-03-19 ENCOUNTER — NON-APPOINTMENT (OUTPATIENT)
Age: 26
End: 2021-03-19

## 2021-03-19 ENCOUNTER — RESULT REVIEW (OUTPATIENT)
Age: 26
End: 2021-03-19

## 2021-03-19 VITALS
SYSTOLIC BLOOD PRESSURE: 115 MMHG | TEMPERATURE: 97.7 F | RESPIRATION RATE: 18 BRPM | HEART RATE: 98 BPM | DIASTOLIC BLOOD PRESSURE: 66 MMHG | BODY MASS INDEX: 25.58 KG/M2 | WEIGHT: 149 LBS

## 2021-03-19 DIAGNOSIS — Z98.891 HISTORY OF UTERINE SCAR FROM PREVIOUS SURGERY: Chronic | ICD-10-CM

## 2021-03-21 LAB
CULTURE RESULTS: SIGNIFICANT CHANGE UP
SPECIMEN SOURCE: SIGNIFICANT CHANGE UP

## 2021-03-22 DIAGNOSIS — Z11.3 ENCOUNTER FOR SCREENING FOR INFECTIONS WITH A PREDOMINANTLY SEXUAL MODE OF TRANSMISSION: ICD-10-CM

## 2021-03-22 DIAGNOSIS — Z36.85 ENCOUNTER FOR ANTENATAL SCREENING FOR STREPTOCOCCUS B: ICD-10-CM

## 2021-03-22 DIAGNOSIS — Z34.83 ENCOUNTER FOR SUPERVISION OF OTHER NORMAL PREGNANCY, THIRD TRIMESTER: ICD-10-CM

## 2021-03-22 DIAGNOSIS — O34.219 MATERNAL CARE FOR UNSPECIFIED TYPE SCAR FROM PREVIOUS CESAREAN DELIVERY: ICD-10-CM

## 2021-03-30 ENCOUNTER — APPOINTMENT (OUTPATIENT)
Dept: ANTEPARTUM | Facility: CLINIC | Age: 26
End: 2021-03-30
Payer: MEDICAID

## 2021-03-30 ENCOUNTER — APPOINTMENT (OUTPATIENT)
Dept: OBGYN | Facility: HOSPITAL | Age: 26
End: 2021-03-30

## 2021-03-30 ENCOUNTER — OUTPATIENT (OUTPATIENT)
Dept: OUTPATIENT SERVICES | Facility: HOSPITAL | Age: 26
LOS: 1 days | End: 2021-03-30

## 2021-03-30 ENCOUNTER — RESULT REVIEW (OUTPATIENT)
Age: 26
End: 2021-03-30

## 2021-03-30 ENCOUNTER — NON-APPOINTMENT (OUTPATIENT)
Age: 26
End: 2021-03-30

## 2021-03-30 ENCOUNTER — ASOB RESULT (OUTPATIENT)
Age: 26
End: 2021-03-30

## 2021-03-30 VITALS — WEIGHT: 149 LBS | SYSTOLIC BLOOD PRESSURE: 104 MMHG | DIASTOLIC BLOOD PRESSURE: 70 MMHG | BODY MASS INDEX: 25.58 KG/M2

## 2021-03-30 VITALS — TEMPERATURE: 98.3 F

## 2021-03-30 DIAGNOSIS — Z98.891 HISTORY OF UTERINE SCAR FROM PREVIOUS SURGERY: Chronic | ICD-10-CM

## 2021-03-30 LAB
BASOPHILS # BLD AUTO: 0.01 K/UL — SIGNIFICANT CHANGE UP (ref 0–0.2)
BASOPHILS NFR BLD AUTO: 0.1 % — SIGNIFICANT CHANGE UP (ref 0–2)
COVID-19 NUCLEOCAPSID GAM AB INTERP: NEGATIVE — SIGNIFICANT CHANGE UP
COVID-19 NUCLEOCAPSID TOTAL GAM ANTIBODY RESULT: 0.7 INDEX — SIGNIFICANT CHANGE UP
EOSINOPHIL # BLD AUTO: 0.06 K/UL — SIGNIFICANT CHANGE UP (ref 0–0.5)
EOSINOPHIL NFR BLD AUTO: 0.8 % — SIGNIFICANT CHANGE UP (ref 0–6)
HCT VFR BLD CALC: 32.8 % — LOW (ref 34.5–45)
HGB BLD-MCNC: 10.5 G/DL — LOW (ref 11.5–15.5)
IANC: 5.56 K/UL — SIGNIFICANT CHANGE UP (ref 1.5–8.5)
IMM GRANULOCYTES NFR BLD AUTO: 0.4 % — SIGNIFICANT CHANGE UP (ref 0–1.5)
LYMPHOCYTES # BLD AUTO: 1.6 K/UL — SIGNIFICANT CHANGE UP (ref 1–3.3)
LYMPHOCYTES # BLD AUTO: 20 % — SIGNIFICANT CHANGE UP (ref 13–44)
MCHC RBC-ENTMCNC: 27.5 PG — SIGNIFICANT CHANGE UP (ref 27–34)
MCHC RBC-ENTMCNC: 32 GM/DL — SIGNIFICANT CHANGE UP (ref 32–36)
MCV RBC AUTO: 85.9 FL — SIGNIFICANT CHANGE UP (ref 80–100)
MONOCYTES # BLD AUTO: 0.74 K/UL — SIGNIFICANT CHANGE UP (ref 0–0.9)
MONOCYTES NFR BLD AUTO: 9.3 % — SIGNIFICANT CHANGE UP (ref 2–14)
NEUTROPHILS # BLD AUTO: 5.56 K/UL — SIGNIFICANT CHANGE UP (ref 1.8–7.4)
NEUTROPHILS NFR BLD AUTO: 69.4 % — SIGNIFICANT CHANGE UP (ref 43–77)
NRBC # BLD: 0 /100 WBCS — SIGNIFICANT CHANGE UP
NRBC # FLD: 0 K/UL — SIGNIFICANT CHANGE UP
PLATELET # BLD AUTO: 196 K/UL — SIGNIFICANT CHANGE UP (ref 150–400)
RBC # BLD: 3.82 M/UL — SIGNIFICANT CHANGE UP (ref 3.8–5.2)
RBC # FLD: 13.9 % — SIGNIFICANT CHANGE UP (ref 10.3–14.5)
SARS-COV-2 IGG+IGM SERPL QL IA: 0.7 INDEX — SIGNIFICANT CHANGE UP
SARS-COV-2 IGG+IGM SERPL QL IA: NEGATIVE — SIGNIFICANT CHANGE UP
WBC # BLD: 8 K/UL — SIGNIFICANT CHANGE UP (ref 3.8–10.5)
WBC # FLD AUTO: 8 K/UL — SIGNIFICANT CHANGE UP (ref 3.8–10.5)

## 2021-03-30 PROCEDURE — 76816 OB US FOLLOW-UP PER FETUS: CPT

## 2021-03-30 PROCEDURE — 76819 FETAL BIOPHYS PROFIL W/O NST: CPT

## 2021-03-30 PROCEDURE — 99072 ADDL SUPL MATRL&STAF TM PHE: CPT

## 2021-03-31 LAB
C TRACH RRNA SPEC QL NAA+PROBE: SIGNIFICANT CHANGE UP
N GONORRHOEA RRNA SPEC QL NAA+PROBE: SIGNIFICANT CHANGE UP
SPECIMEN SOURCE: SIGNIFICANT CHANGE UP

## 2021-04-01 ENCOUNTER — OUTPATIENT (OUTPATIENT)
Dept: OUTPATIENT SERVICES | Facility: HOSPITAL | Age: 26
LOS: 1 days | End: 2021-04-01
Payer: MEDICAID

## 2021-04-01 VITALS
HEIGHT: 65 IN | RESPIRATION RATE: 16 BRPM | DIASTOLIC BLOOD PRESSURE: 80 MMHG | WEIGHT: 147.93 LBS | OXYGEN SATURATION: 98 % | TEMPERATURE: 98 F | SYSTOLIC BLOOD PRESSURE: 120 MMHG | HEART RATE: 98 BPM

## 2021-04-01 DIAGNOSIS — Z98.891 HISTORY OF UTERINE SCAR FROM PREVIOUS SURGERY: Chronic | ICD-10-CM

## 2021-04-01 DIAGNOSIS — Z98.890 OTHER SPECIFIED POSTPROCEDURAL STATES: ICD-10-CM

## 2021-04-01 DIAGNOSIS — Z34.90 ENCOUNTER FOR SUPERVISION OF NORMAL PREGNANCY, UNSPECIFIED, UNSPECIFIED TRIMESTER: ICD-10-CM

## 2021-04-01 LAB
APPEARANCE UR: ABNORMAL
BILIRUB UR-MCNC: NEGATIVE — SIGNIFICANT CHANGE UP
BLD GP AB SCN SERPL QL: POSITIVE — SIGNIFICANT CHANGE UP
COLOR SPEC: YELLOW — SIGNIFICANT CHANGE UP
DIFF PNL FLD: NEGATIVE — SIGNIFICANT CHANGE UP
GLUCOSE UR QL: NEGATIVE — SIGNIFICANT CHANGE UP
HCT VFR BLD CALC: 32.3 % — LOW (ref 34.5–45)
HGB BLD-MCNC: 10.4 G/DL — LOW (ref 11.5–15.5)
KETONES UR-MCNC: NEGATIVE — SIGNIFICANT CHANGE UP
LEUKOCYTE ESTERASE UR-ACNC: NEGATIVE — SIGNIFICANT CHANGE UP
MCHC RBC-ENTMCNC: 28.5 PG — SIGNIFICANT CHANGE UP (ref 27–34)
MCHC RBC-ENTMCNC: 32.2 GM/DL — SIGNIFICANT CHANGE UP (ref 32–36)
MCV RBC AUTO: 88.5 FL — SIGNIFICANT CHANGE UP (ref 80–100)
NITRITE UR-MCNC: NEGATIVE — SIGNIFICANT CHANGE UP
NRBC # BLD: 0 /100 WBCS — SIGNIFICANT CHANGE UP
NRBC # FLD: 0 K/UL — SIGNIFICANT CHANGE UP
PH UR: 7 — SIGNIFICANT CHANGE UP (ref 5–8)
PLATELET # BLD AUTO: 187 K/UL — SIGNIFICANT CHANGE UP (ref 150–400)
PROT UR-MCNC: ABNORMAL
RBC # BLD: 3.65 M/UL — LOW (ref 3.8–5.2)
RBC # FLD: 14 % — SIGNIFICANT CHANGE UP (ref 10.3–14.5)
RH IG SCN BLD-IMP: NEGATIVE — SIGNIFICANT CHANGE UP
SP GR SPEC: 1.02 — SIGNIFICANT CHANGE UP (ref 1.01–1.02)
UROBILINOGEN FLD QL: SIGNIFICANT CHANGE UP
WBC # BLD: 7.1 K/UL — SIGNIFICANT CHANGE UP (ref 3.8–10.5)
WBC # FLD AUTO: 7.1 K/UL — SIGNIFICANT CHANGE UP (ref 3.8–10.5)

## 2021-04-01 PROCEDURE — 86077 PHYS BLOOD BANK SERV XMATCH: CPT

## 2021-04-01 RX ORDER — CITRIC ACID/SODIUM CITRATE 300-500 MG
30 SOLUTION, ORAL ORAL ONCE
Refills: 0 | Status: COMPLETED | OUTPATIENT
Start: 2021-04-14 | End: 2021-04-14

## 2021-04-01 RX ORDER — SODIUM CHLORIDE 9 MG/ML
1000 INJECTION, SOLUTION INTRAVENOUS ONCE
Refills: 0 | Status: COMPLETED | OUTPATIENT
Start: 2021-04-14 | End: 2021-04-14

## 2021-04-01 NOTE — OB PST NOTE - HISTORY OF PRESENT ILLNESS
26 year old pregnant female presents to presurgical testing scheduled for repeat  section. Patient denies vaginal bleeding, spotting or leakage of amniotic fluid. Patient denies regular contractions. Patient reports positive fetal movement.

## 2021-04-01 NOTE — OB PST NOTE - NSHPREVIEWOFSYSTEMS_GEN_ALL_CORE
General: No fever, chills, sweating, anorexia, weight loss or weight gain. Denies recent travel or hx of COVID-19 infection     Skin: No rashes, itching, or dryness. No change in size/color of moles.     Breast: No tenderness, lumps, or nipple discharge      Ophthalmologic: No diplopia, photophobia, lacrimation, blurred Vision , or eye discharge    ENMT Symptoms: No hearing difficulty, ear pain, tinnitus, or vertigo. No sinus symptoms, nasal congestion, nasal   discharge, or nasal obstruction    Respiratory and Thorax: No wheezing, dyspnea, cough, hemoptysis, or pleuritic chest Pain     Cardiovascular: No chest pain, palpitations, dyspnea on exertion, orthopnea, paroxysmal nocturnal dyspnea,   peripheral edema, or claudication    Gastrointestinal: No nausea, vomiting, diarrhea, constipation, change in bowel habits, flatulence, abdominal pain, or melena    Genitourinary/ Pelvis: No hematuria, renal colic, or flank pain.  No urine discoloration, incontinence, dysuria, or urinary hesitancy. Normal urinary frequency. No nocturia, abnormal vaginal bleeding, vaginal discharge, spotting, pelvic pain, or vaginal leakage    Musculoskeletal: No arthralgia, arthritis, joint swelling, muscle cramping, muscle weakness, neck pain, arm pain, or leg pain    Neurological: No transient paralysis, weakness, paresthesias, or seizures. No syncope, tremors, vertigo, loss of sensation, difficulty walking, loss of consciousness, hemiparesis, confusion, or facial palsy    Psychiatric: No suicidal ideation, depression, anxiety, insomnia, memory loss, paranoia, mood swings, agitation, hallucinations, or hyperactivity    Hematology: No gum bleeding, nose bleeding, or skin lumps    Lymphatic: No enlarged or tender lymph nodes. No extremity swelling    Endocrine: No heat or cold intolerance    Immunologic: No recurrent or persistent infections

## 2021-04-01 NOTE — OB PST NOTE - NSHPPHYSICALEXAM_GEN_ALL_CORE

## 2021-04-01 NOTE — OB PST NOTE - PROBLEM SELECTOR PLAN 1
Patient tentatively scheduled for repeat  section on 21.  Pre-op instructions provided. Pt given verbal and written instructions with teach back on chlorhexidine shampoo . Pt verbalized understanding with return demonstration.   Covid testing scheduled prior to surgery as per patient.   CBC, T&S, UA done - pending  Patient to obtain all instructions regarding medications from OB. Patient verbalized understanding.

## 2021-04-01 NOTE — OB PST NOTE - NSANTHOSAYNRD_GEN_A_CORE
No. LILI screening performed.  STOP BANG Legend: 0-2 = LOW Risk; 3-4 = INTERMEDIATE Risk; 5-8 = HIGH Risk

## 2021-04-06 ENCOUNTER — APPOINTMENT (OUTPATIENT)
Dept: OBGYN | Facility: HOSPITAL | Age: 26
End: 2021-04-06

## 2021-04-06 ENCOUNTER — OUTPATIENT (OUTPATIENT)
Dept: OUTPATIENT SERVICES | Facility: HOSPITAL | Age: 26
LOS: 1 days | End: 2021-04-06

## 2021-04-06 ENCOUNTER — NON-APPOINTMENT (OUTPATIENT)
Age: 26
End: 2021-04-06

## 2021-04-06 VITALS
BODY MASS INDEX: 25.1 KG/M2 | HEART RATE: 93 BPM | DIASTOLIC BLOOD PRESSURE: 71 MMHG | WEIGHT: 147 LBS | SYSTOLIC BLOOD PRESSURE: 111 MMHG | TEMPERATURE: 97.7 F | HEIGHT: 64 IN

## 2021-04-06 DIAGNOSIS — Z34.92 ENCOUNTER FOR SUPERVISION OF NORMAL PREGNANCY, UNSPECIFIED, SECOND TRIMESTER: ICD-10-CM

## 2021-04-06 DIAGNOSIS — D64.9 ANEMIA, UNSPECIFIED: ICD-10-CM

## 2021-04-06 DIAGNOSIS — Z34.83 ENCOUNTER FOR SUPERVISION OF OTHER NORMAL PREGNANCY, THIRD TRIMESTER: ICD-10-CM

## 2021-04-06 DIAGNOSIS — O34.219 MATERNAL CARE FOR UNSPECIFIED TYPE SCAR FROM PREVIOUS CESAREAN DELIVERY: ICD-10-CM

## 2021-04-06 DIAGNOSIS — Z32.00 ENCOUNTER FOR PREGNANCY TEST, RESULT UNKNOWN: ICD-10-CM

## 2021-04-06 DIAGNOSIS — Z98.891 HISTORY OF UTERINE SCAR FROM PREVIOUS SURGERY: Chronic | ICD-10-CM

## 2021-04-06 PROBLEM — Z34.90 ENCOUNTER FOR SUPERVISION OF NORMAL PREGNANCY, UNSPECIFIED, UNSPECIFIED TRIMESTER: Chronic | Status: ACTIVE | Noted: 2021-04-01

## 2021-04-07 DIAGNOSIS — Z34.83 ENCOUNTER FOR SUPERVISION OF OTHER NORMAL PREGNANCY, THIRD TRIMESTER: ICD-10-CM

## 2021-04-11 ENCOUNTER — APPOINTMENT (OUTPATIENT)
Dept: DISASTER EMERGENCY | Facility: CLINIC | Age: 26
End: 2021-04-11

## 2021-04-11 DIAGNOSIS — Z01.818 ENCOUNTER FOR OTHER PREPROCEDURAL EXAMINATION: ICD-10-CM

## 2021-04-13 ENCOUNTER — TRANSCRIPTION ENCOUNTER (OUTPATIENT)
Age: 26
End: 2021-04-13

## 2021-04-13 ENCOUNTER — APPOINTMENT (OUTPATIENT)
Dept: OBGYN | Facility: HOSPITAL | Age: 26
End: 2021-04-13

## 2021-04-13 RX ORDER — FAMOTIDINE 10 MG/ML
20 INJECTION INTRAVENOUS ONCE
Refills: 0 | Status: COMPLETED | OUTPATIENT
Start: 2021-04-14 | End: 2021-04-14

## 2021-04-14 ENCOUNTER — INPATIENT (INPATIENT)
Facility: HOSPITAL | Age: 26
LOS: 1 days | Discharge: HOME | End: 2021-04-16
Attending: SPECIALIST | Admitting: SPECIALIST
Payer: MEDICAID

## 2021-04-14 VITALS — HEART RATE: 106 BPM | TEMPERATURE: 98 F | SYSTOLIC BLOOD PRESSURE: 112 MMHG | DIASTOLIC BLOOD PRESSURE: 67 MMHG

## 2021-04-14 DIAGNOSIS — Z98.890 OTHER SPECIFIED POSTPROCEDURAL STATES: ICD-10-CM

## 2021-04-14 DIAGNOSIS — Z98.891 HISTORY OF UTERINE SCAR FROM PREVIOUS SURGERY: Chronic | ICD-10-CM

## 2021-04-14 LAB
BLD GP AB SCN SERPL QL: POSITIVE — SIGNIFICANT CHANGE UP
HCT VFR BLD CALC: 35.5 % — SIGNIFICANT CHANGE UP (ref 34.5–45)
HGB BLD-MCNC: 11.6 G/DL — SIGNIFICANT CHANGE UP (ref 11.5–15.5)
MCHC RBC-ENTMCNC: 28.8 PG — SIGNIFICANT CHANGE UP (ref 27–34)
MCHC RBC-ENTMCNC: 32.7 GM/DL — SIGNIFICANT CHANGE UP (ref 32–36)
MCV RBC AUTO: 88.1 FL — SIGNIFICANT CHANGE UP (ref 80–100)
NRBC # BLD: 0 /100 WBCS — SIGNIFICANT CHANGE UP
NRBC # FLD: 0 K/UL — SIGNIFICANT CHANGE UP
PLATELET # BLD AUTO: 185 K/UL — SIGNIFICANT CHANGE UP (ref 150–400)
RBC # BLD: 4.03 M/UL — SIGNIFICANT CHANGE UP (ref 3.8–5.2)
RBC # FLD: 14.5 % — SIGNIFICANT CHANGE UP (ref 10.3–14.5)
RH IG SCN BLD-IMP: NEGATIVE — SIGNIFICANT CHANGE UP
WBC # BLD: 8.23 K/UL — SIGNIFICANT CHANGE UP (ref 3.8–10.5)
WBC # FLD AUTO: 8.23 K/UL — SIGNIFICANT CHANGE UP (ref 3.8–10.5)

## 2021-04-14 PROCEDURE — 59514 CESAREAN DELIVERY ONLY: CPT | Mod: U9,UB,GC

## 2021-04-14 PROCEDURE — 86077 PHYS BLOOD BANK SERV XMATCH: CPT

## 2021-04-14 RX ORDER — ACETAMINOPHEN 500 MG
3 TABLET ORAL
Qty: 0 | Refills: 0 | DISCHARGE
Start: 2021-04-14

## 2021-04-14 RX ORDER — KETOROLAC TROMETHAMINE 30 MG/ML
30 SYRINGE (ML) INJECTION EVERY 6 HOURS
Refills: 0 | Status: COMPLETED | OUTPATIENT
Start: 2021-04-14 | End: 2021-04-15

## 2021-04-14 RX ORDER — ACETAMINOPHEN 500 MG
975 TABLET ORAL
Refills: 0 | Status: DISCONTINUED | OUTPATIENT
Start: 2021-04-14 | End: 2021-04-16

## 2021-04-14 RX ORDER — ACETAMINOPHEN 500 MG
1000 TABLET ORAL ONCE
Refills: 0 | Status: DISCONTINUED | OUTPATIENT
Start: 2021-04-14 | End: 2021-04-16

## 2021-04-14 RX ORDER — HEPARIN SODIUM 5000 [USP'U]/ML
5000 INJECTION INTRAVENOUS; SUBCUTANEOUS EVERY 12 HOURS
Refills: 0 | Status: DISCONTINUED | OUTPATIENT
Start: 2021-04-14 | End: 2021-04-16

## 2021-04-14 RX ORDER — ASPIRIN/CALCIUM CARB/MAGNESIUM 324 MG
1 TABLET ORAL
Qty: 0 | Refills: 0 | DISCHARGE

## 2021-04-14 RX ORDER — OXYTOCIN 10 UNIT/ML
333.33 VIAL (ML) INJECTION
Qty: 20 | Refills: 0 | Status: DISCONTINUED | OUTPATIENT
Start: 2021-04-14 | End: 2021-04-15

## 2021-04-14 RX ORDER — SIMETHICONE 80 MG/1
80 TABLET, CHEWABLE ORAL EVERY 4 HOURS
Refills: 0 | Status: DISCONTINUED | OUTPATIENT
Start: 2021-04-14 | End: 2021-04-16

## 2021-04-14 RX ORDER — SODIUM CHLORIDE 9 MG/ML
1000 INJECTION, SOLUTION INTRAVENOUS
Refills: 0 | Status: DISCONTINUED | OUTPATIENT
Start: 2021-04-14 | End: 2021-04-15

## 2021-04-14 RX ORDER — NALOXONE HYDROCHLORIDE 4 MG/.1ML
0.1 SPRAY NASAL
Refills: 0 | Status: DISCONTINUED | OUTPATIENT
Start: 2021-04-14 | End: 2021-04-15

## 2021-04-14 RX ORDER — DEXAMETHASONE 0.5 MG/5ML
4 ELIXIR ORAL EVERY 6 HOURS
Refills: 0 | Status: DISCONTINUED | OUTPATIENT
Start: 2021-04-14 | End: 2021-04-15

## 2021-04-14 RX ORDER — SODIUM CHLORIDE 9 MG/ML
1000 INJECTION, SOLUTION INTRAVENOUS
Refills: 0 | Status: DISCONTINUED | OUTPATIENT
Start: 2021-04-14 | End: 2021-04-16

## 2021-04-14 RX ORDER — FERROUS SULFATE 325(65) MG
1 TABLET ORAL
Qty: 0 | Refills: 0 | DISCHARGE

## 2021-04-14 RX ORDER — MAGNESIUM HYDROXIDE 400 MG/1
30 TABLET, CHEWABLE ORAL
Refills: 0 | Status: DISCONTINUED | OUTPATIENT
Start: 2021-04-14 | End: 2021-04-16

## 2021-04-14 RX ORDER — ACETAMINOPHEN 500 MG
1000 TABLET ORAL ONCE
Refills: 0 | Status: COMPLETED | OUTPATIENT
Start: 2021-04-14 | End: 2021-04-14

## 2021-04-14 RX ORDER — OXYCODONE HYDROCHLORIDE 5 MG/1
10 TABLET ORAL
Refills: 0 | Status: DISCONTINUED | OUTPATIENT
Start: 2021-04-14 | End: 2021-04-15

## 2021-04-14 RX ORDER — TETANUS TOXOID, REDUCED DIPHTHERIA TOXOID AND ACELLULAR PERTUSSIS VACCINE, ADSORBED 5; 2.5; 8; 8; 2.5 [IU]/.5ML; [IU]/.5ML; UG/.5ML; UG/.5ML; UG/.5ML
0.5 SUSPENSION INTRAMUSCULAR ONCE
Refills: 0 | Status: DISCONTINUED | OUTPATIENT
Start: 2021-04-14 | End: 2021-04-16

## 2021-04-14 RX ORDER — OXYCODONE HYDROCHLORIDE 5 MG/1
5 TABLET ORAL
Refills: 0 | Status: COMPLETED | OUTPATIENT
Start: 2021-04-14 | End: 2021-04-21

## 2021-04-14 RX ORDER — IBUPROFEN 200 MG
600 TABLET ORAL EVERY 6 HOURS
Refills: 0 | Status: COMPLETED | OUTPATIENT
Start: 2021-04-14 | End: 2022-03-13

## 2021-04-14 RX ORDER — SODIUM CHLORIDE 9 MG/ML
1000 INJECTION, SOLUTION INTRAVENOUS
Refills: 0 | Status: DISCONTINUED | OUTPATIENT
Start: 2021-04-14 | End: 2021-04-14

## 2021-04-14 RX ORDER — OXYCODONE HYDROCHLORIDE 5 MG/1
5 TABLET ORAL ONCE
Refills: 0 | Status: DISCONTINUED | OUTPATIENT
Start: 2021-04-14 | End: 2021-04-16

## 2021-04-14 RX ORDER — IBUPROFEN 200 MG
1 TABLET ORAL
Qty: 0 | Refills: 0 | DISCHARGE
Start: 2021-04-14

## 2021-04-14 RX ORDER — ONDANSETRON 8 MG/1
4 TABLET, FILM COATED ORAL EVERY 6 HOURS
Refills: 0 | Status: DISCONTINUED | OUTPATIENT
Start: 2021-04-14 | End: 2021-04-16

## 2021-04-14 RX ORDER — OXYCODONE HYDROCHLORIDE 5 MG/1
5 TABLET ORAL
Refills: 0 | Status: DISCONTINUED | OUTPATIENT
Start: 2021-04-14 | End: 2021-04-15

## 2021-04-14 RX ORDER — LANOLIN
1 OINTMENT (GRAM) TOPICAL EVERY 6 HOURS
Refills: 0 | Status: DISCONTINUED | OUTPATIENT
Start: 2021-04-14 | End: 2021-04-16

## 2021-04-14 RX ORDER — MORPHINE SULFATE 50 MG/1
0.1 CAPSULE, EXTENDED RELEASE ORAL ONCE
Refills: 0 | Status: DISCONTINUED | OUTPATIENT
Start: 2021-04-14 | End: 2021-04-15

## 2021-04-14 RX ORDER — DIPHENHYDRAMINE HCL 50 MG
25 CAPSULE ORAL EVERY 6 HOURS
Refills: 0 | Status: DISCONTINUED | OUTPATIENT
Start: 2021-04-14 | End: 2021-04-16

## 2021-04-14 RX ADMIN — HEPARIN SODIUM 5000 UNIT(S): 5000 INJECTION INTRAVENOUS; SUBCUTANEOUS at 23:07

## 2021-04-14 RX ADMIN — Medication 0.2 MILLIGRAM(S): at 19:02

## 2021-04-14 RX ADMIN — FAMOTIDINE 20 MILLIGRAM(S): 10 INJECTION INTRAVENOUS at 14:18

## 2021-04-14 RX ADMIN — Medication 30 MILLIGRAM(S): at 23:11

## 2021-04-14 RX ADMIN — Medication 400 MILLIGRAM(S): at 18:51

## 2021-04-14 RX ADMIN — Medication 0.2 MILLIGRAM(S): at 23:07

## 2021-04-14 RX ADMIN — Medication 1000 MILLIUNIT(S)/MIN: at 16:31

## 2021-04-14 RX ADMIN — SODIUM CHLORIDE 75 MILLILITER(S): 9 INJECTION, SOLUTION INTRAVENOUS at 16:31

## 2021-04-14 RX ADMIN — Medication 30 MILLILITER(S): at 14:18

## 2021-04-14 RX ADMIN — Medication 1000 MILLIGRAM(S): at 19:05

## 2021-04-14 RX ADMIN — SODIUM CHLORIDE 2000 MILLILITER(S): 9 INJECTION, SOLUTION INTRAVENOUS at 14:18

## 2021-04-14 NOTE — OB RN INTRAOPERATIVE NOTE - NSSELHIDDEN_OBGYN_ALL_OB_FT
[NS_DeliveryAttending1_OBGYN_ALL_OB_FT:[id number]],[NS_DeliveryAssist1_OBGYN_ALL_OB_FT:[id number]],[NS_DeliveryRN_OBGYN_ALL_OB_FT:[id number]]

## 2021-04-14 NOTE — OB RN DELIVERY SUMMARY - NSSELHIDDEN_OBGYN_ALL_OB_FT
[NS_DeliveryAttending1_OBGYN_ALL_OB_FT:[id number]],[NS_DeliveryAssist1_OBGYN_ALL_OB_FT:[id number]],[NS_DeliveryRN_OBGYN_ALL_OB_FT:[id number]==]
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
## 2021-04-14 NOTE — PROVIDER CONTACT NOTE (OTHER) - SITUATION
patient s/p rpt scheduled c/s. QBL in , vss. on fundal assessment, clots expressed. 200cc noted on pad.

## 2021-04-14 NOTE — OB PROVIDER H&P - HISTORY OF PRESENT ILLNESS
Pt is a  26 G6 1041d at 39.5w presenting for scheduled repeat  section. PNC uncomplicated. GBS negative. EFW 8#10. Patient reports +FM, -Cx, -LOF, -VB. Denies BP problems during current pregnancy.     OBhx: G1 -  - 9#7 - arrest of dilation/Breech (?)/ sPEC/Mg x 24 hr postpartum PP course c/b postpartum depression, never medicated, resolved spontaneously.  eTOP x4 s/p D&C  GYN: denies fibroids/cysts/abnormal pap smears; +hx of chlamydia, , treated  PMH: Denies  PSH: Breast augmentation - 2019  D&C x4  C/s x1  Meds: ASA, PNV  All: NKDA  SocHx: no tobacco, alcohol, recreational drug use; Hx of PP Depression as above; per chart review, hx of suicide attempt by ASA ingestion   FHx: Denies Pt is a  26 [id number] at 39.5w presenting for scheduled repeat  section. PNC uncomplicated. GBS negative. EFW 8#10. Patient reports +FM, -Cx, -LOF, -VB. Denies BP problems during current pregnancy.     OBhx: G1 -  - pLTCS@40wks - 9#7 - arrest of dilation/Breech (?)/ sPEC/Mg x 24 hr postpartum PP course c/b postpartum depression, never medicated, resolved spontaneously.  eTOP x4 s/p D&C  GYN: denies fibroids/cysts/abnormal pap smears; +hx of chlamydia, , treated  PMH: Denies  PSH: Breast augmentation - 2019  D&C x4  C/s x1  Meds: ASA, PNV  All: NKDA  SocHx: no tobacco, alcohol, recreational drug use; Hx of PP Depression as above; per chart review, hx of suicide attempt by ASA ingestion   FHx: Denies

## 2021-04-14 NOTE — OB PROVIDER H&P - NSHPPHYSICALEXAM_GEN_ALL_CORE
Vital Signs Last 24 Hours  T(C): 36.8 (04-14-21 @ 13:24), Max: 36.9 (04-14-21 @ 13:01)  HR: 106 (04-14-21 @ 13:24) (106 - 106)  BP: 112/67 (04-14-21 @ 13:24) (112/67 - 112/67)  RR: 15 (04-14-21 @ 13:24) (15 - 15)  SpO2: --    Gen: in NAD  Pulm: breathing comfortably on RA  Abd: soft, gravid, well healed Pfannenstiel incision   SVE: deferred  TAUS: Vertex  EFM: 130bpm, mod variability, +accels, -decels  Tobin: irritability

## 2021-04-14 NOTE — CHART NOTE - NSCHARTNOTEFT_GEN_A_CORE
Called to see pt who is S/P R C/S who passed about 200 cc of blood clots.    Examined pt - Uterus with clots inside of it - about 100 cc expressed    Fundus firm    Will   1) Start Methergine    Will follow    VIVIAN Vital

## 2021-04-14 NOTE — OB PROVIDER DELIVERY SUMMARY - NSSELHIDDEN_OBGYN_ALL_OB_FT
[NS_DeliveryAttending1_OBGYN_ALL_OB_FT:[id number]],[NS_DeliveryAssist1_OBGYN_ALL_OB_FT:[id number]]

## 2021-04-14 NOTE — OB PROVIDER H&P - ASSESSMENT
Pt is a  26 G6 1041d at 39.5w presenting for scheduled repeat  section.  -Admit to L&D  -Routine labs  -Preop meds  -Cat 1 FHT  -GBS neg  -COVID neg  -Anesthesia consult  -Blood type A neg; will need Rhogam PP  -Patient offered opportunity to ; risk and benefits reviewed including risk of uterine rupture, but patient elects for rLTCS.   -Surgical consents signed; risks and benefits discussed and patient expressed understanding.   -Desires pp nexplanon  -Desires circumcision; consents signed  - PP for hx of PP depression      D/w Dr. Traugott RFrankel PGY2

## 2021-04-15 ENCOUNTER — TRANSCRIPTION ENCOUNTER (OUTPATIENT)
Age: 26
End: 2021-04-15

## 2021-04-15 LAB
BASOPHILS # BLD AUTO: 0.02 K/UL — SIGNIFICANT CHANGE UP (ref 0–0.2)
BASOPHILS NFR BLD AUTO: 0.2 % — SIGNIFICANT CHANGE UP (ref 0–2)
COVID-19 SPIKE DOMAIN AB INTERP: POSITIVE
COVID-19 SPIKE DOMAIN ANTIBODY RESULT: 24 U/ML — HIGH
EOSINOPHIL # BLD AUTO: 0.04 K/UL — SIGNIFICANT CHANGE UP (ref 0–0.5)
EOSINOPHIL NFR BLD AUTO: 0.4 % — SIGNIFICANT CHANGE UP (ref 0–6)
HCT VFR BLD CALC: 28.4 % — LOW (ref 34.5–45)
HGB BLD-MCNC: 9.5 G/DL — LOW (ref 11.5–15.5)
IANC: 7.49 K/UL — SIGNIFICANT CHANGE UP (ref 1.5–8.5)
IMM GRANULOCYTES NFR BLD AUTO: 0.4 % — SIGNIFICANT CHANGE UP (ref 0–1.5)
KLEIHAUER-BETKE CALCULATION: 0 % — SIGNIFICANT CHANGE UP
LYMPHOCYTES # BLD AUTO: 1.76 K/UL — SIGNIFICANT CHANGE UP (ref 1–3.3)
LYMPHOCYTES # BLD AUTO: 17.4 % — SIGNIFICANT CHANGE UP (ref 13–44)
MCHC RBC-ENTMCNC: 29.1 PG — SIGNIFICANT CHANGE UP (ref 27–34)
MCHC RBC-ENTMCNC: 33.5 GM/DL — SIGNIFICANT CHANGE UP (ref 32–36)
MCV RBC AUTO: 86.9 FL — SIGNIFICANT CHANGE UP (ref 80–100)
MONOCYTES # BLD AUTO: 0.78 K/UL — SIGNIFICANT CHANGE UP (ref 0–0.9)
MONOCYTES NFR BLD AUTO: 7.7 % — SIGNIFICANT CHANGE UP (ref 2–14)
NEUTROPHILS # BLD AUTO: 7.49 K/UL — HIGH (ref 1.8–7.4)
NEUTROPHILS NFR BLD AUTO: 73.9 % — SIGNIFICANT CHANGE UP (ref 43–77)
NRBC # BLD: 0 /100 WBCS — SIGNIFICANT CHANGE UP
NRBC # FLD: 0 K/UL — SIGNIFICANT CHANGE UP
PLATELET # BLD AUTO: 154 K/UL — SIGNIFICANT CHANGE UP (ref 150–400)
RBC # BLD: 3.27 M/UL — LOW (ref 3.8–5.2)
RBC # FLD: 14.1 % — SIGNIFICANT CHANGE UP (ref 10.3–14.5)
SARS-COV-2 IGG+IGM SERPL QL IA: 24 U/ML — HIGH
SARS-COV-2 IGG+IGM SERPL QL IA: POSITIVE
T PALLIDUM AB TITR SER: NEGATIVE — SIGNIFICANT CHANGE UP
WBC # BLD: 10.13 K/UL — SIGNIFICANT CHANGE UP (ref 3.8–10.5)
WBC # FLD AUTO: 10.13 K/UL — SIGNIFICANT CHANGE UP (ref 3.8–10.5)

## 2021-04-15 RX ORDER — OXYCODONE HYDROCHLORIDE 5 MG/1
1 TABLET ORAL
Qty: 6 | Refills: 0
Start: 2021-04-15

## 2021-04-15 RX ORDER — OXYCODONE HYDROCHLORIDE 5 MG/1
5 TABLET ORAL
Refills: 0 | Status: DISCONTINUED | OUTPATIENT
Start: 2021-04-15 | End: 2021-04-16

## 2021-04-15 RX ORDER — IBUPROFEN 200 MG
600 TABLET ORAL EVERY 6 HOURS
Refills: 0 | Status: DISCONTINUED | OUTPATIENT
Start: 2021-04-15 | End: 2021-04-16

## 2021-04-15 RX ORDER — OXYCODONE HYDROCHLORIDE 5 MG/1
5 TABLET ORAL ONCE
Refills: 0 | Status: DISCONTINUED | OUTPATIENT
Start: 2021-04-15 | End: 2021-04-15

## 2021-04-15 RX ADMIN — Medication 600 MILLIGRAM(S): at 22:26

## 2021-04-15 RX ADMIN — Medication 600 MILLIGRAM(S): at 22:55

## 2021-04-15 RX ADMIN — Medication 600 MILLIGRAM(S): at 13:12

## 2021-04-15 RX ADMIN — Medication 975 MILLIGRAM(S): at 10:33

## 2021-04-15 RX ADMIN — OXYCODONE HYDROCHLORIDE 5 MILLIGRAM(S): 5 TABLET ORAL at 23:15

## 2021-04-15 RX ADMIN — Medication 975 MILLIGRAM(S): at 20:25

## 2021-04-15 RX ADMIN — Medication 975 MILLIGRAM(S): at 20:55

## 2021-04-15 RX ADMIN — Medication 0.2 MILLIGRAM(S): at 14:09

## 2021-04-15 RX ADMIN — Medication 30 MILLIGRAM(S): at 06:45

## 2021-04-15 RX ADMIN — Medication 600 MILLIGRAM(S): at 14:09

## 2021-04-15 RX ADMIN — Medication 0.2 MILLIGRAM(S): at 09:59

## 2021-04-15 RX ADMIN — Medication 30 MILLIGRAM(S): at 00:30

## 2021-04-15 RX ADMIN — Medication 975 MILLIGRAM(S): at 09:59

## 2021-04-15 RX ADMIN — HEPARIN SODIUM 5000 UNIT(S): 5000 INJECTION INTRAVENOUS; SUBCUTANEOUS at 13:11

## 2021-04-15 RX ADMIN — OXYCODONE HYDROCHLORIDE 5 MILLIGRAM(S): 5 TABLET ORAL at 22:48

## 2021-04-15 RX ADMIN — Medication 0.2 MILLIGRAM(S): at 02:46

## 2021-04-15 RX ADMIN — Medication 0.2 MILLIGRAM(S): at 05:55

## 2021-04-15 RX ADMIN — Medication 30 MILLIGRAM(S): at 05:55

## 2021-04-15 RX ADMIN — SIMETHICONE 80 MILLIGRAM(S): 80 TABLET, CHEWABLE ORAL at 22:47

## 2021-04-15 NOTE — DISCHARGE NOTE OB - CARE PROVIDER_API CALL
Low risk clinic,   [facility]  Oncology Building, [facility]  [address] [place] [address] [id number] [phone]  Phone: (   )    -  Fax: (   )    -  Follow Up Time:

## 2021-04-15 NOTE — PROGRESS NOTE ADULT - ASSESSMENT
25y/o  [facility]. PMH significant for sPEC with prior pregnancy, suicide attempt in 2018.  25[facility]. PMH significant for sPEC with prior pregnancy, suicide attempt in 2018. No current s/s of PEC.

## 2021-04-15 NOTE — DISCHARGE NOTE OB - MEDICATION SUMMARY - MEDICATIONS TO TAKE
I will START or STAY ON the medications listed below when I get home from the hospital:    ibuprofen 600 mg oral tablet  -- 1 tab(s) by mouth every 6 hours, As Needed  -- Indication: For  delivery delivered    acetaminophen 325 mg oral tablet  -- 3 tab(s) by mouth , As Needed  -- Indication: For  delivery delivered    oxyCODONE 5 mg oral tablet  -- 1 tab(s) by mouth every 6 hours MDD:4  -- Caution federal law prohibits the transfer of this drug to any person other  than the person for whom it was prescribed.  It is very important that you take or use this exactly as directed.  Do not skip doses or discontinue unless directed by your doctor.  May cause drowsiness or dizziness.  This prescription cannot be refilled.  Using more of this medication than prescribed may cause serious breathing problems.    -- Indication: For  delivery delivered

## 2021-04-15 NOTE — DISCHARGE NOTE OB - CARE PLAN
Principal Discharge DX:	 delivery delivered  Goal:	Recovery  Assessment and plan of treatment:	Make a follow-up appointment with your doctor in TWO WEEKS for an incision check and in SIX WEEKS for a postpartum appointment. No heavy lifting or strenuous activity for six weeks. Nothing in the vagina (such as tampons, douches, intercourse or tub baths) until you see your doctor. You can take 1000mg of Tylenol and/or 600mg of Motrin every 6 hours for pain. You may take Oxycodone for breakthrough pain no more than once every 8 hours. Call your doctor with any signs and symptoms of infection such as fever, chills, nausea or vomiting; if you're unable to tolerate food, have an increase in vaginal bleeding, or have difficulty urinating; or if you have pain that is not relieved by medication. Notify your doctor with any other concerns including feeling depressed or "down".

## 2021-04-15 NOTE — DISCHARGE NOTE OB - PROVIDER TOKENS
FREE:[LAST:[Low risk clinic],PHONE:[(   )    -],FAX:[(   )    -],ADDRESS:[[facility], [facility]  [address] [place] [address]  [phone]]]

## 2021-04-15 NOTE — DISCHARGE NOTE OB - HOSPITAL COURSE
Hospital Course:  Patient underwent an uncomplicated rLTCS (please see operative note for details of procedure.)     EBL: 687  Hct: 35.5->28.4     Patient's post operative course was unremarkable and she remained hemodynamically stable and afebrile throughout. Upon discharge on POD3, the patient is ambulating and voiding spontaneously, tolerated oral intake, pain was well controlled with oral medications and vital signs were stable. Hospital Course:  Patient underwent an uncomplicated rLTCS (please see operative note for details of procedure.)     EBL: 687  Hct: 35.5->28.4     Patient's post operative course was unremarkable and she remained hemodynamically stable and afebrile throughout. Her blood pressures were well controlled with no s/s of pre-eclampsia. Upon discharge on POD2, the patient is ambulating and voiding spontaneously, tolerated oral intake, pain was well controlled with oral medications and vital signs were stable.               9.5    10.13 )-----------( 154      ( 04-15 @ 06:42 )             28.4                11.6   8.23  )-----------( 185      ( 04-14 @ 13:29 )             35.5                10.4   7.10  )-----------( 187      ( 04-01 @ 13:55 )             32.3

## 2021-04-15 NOTE — DISCHARGE NOTE OB - PLAN OF CARE
Make a follow-up appointment with your doctor in TWO WEEKS for an incision check and in SIX WEEKS for a postpartum appointment. No heavy lifting or strenuous activity for six weeks. Nothing in the vagina (such as tampons, douches, intercourse or tub baths) until you see your doctor. You can take 1000mg of Tylenol and/or 600mg of Motrin every 6 hours for pain. You may take Oxycodone for breakthrough pain no more than once every 8 hours. Call your doctor with any signs and symptoms of infection such as fever, chills, nausea or vomiting; if you're unable to tolerate food, have an increase in vaginal bleeding, or have difficulty urinating; or if you have pain that is not relieved by medication. Notify your doctor with any other concerns including feeling depressed or "down". Recovery

## 2021-04-15 NOTE — PROGRESS NOTE ADULT - ATTENDING COMMENTS
Associate Chief of L & D ( late entry)     I have met this patient for the first time today.  She was admitted by Dr Cross and delivered for repeat c section    OB Progress Note:  Delivery, POD#1    S: 25yo POD#1 s/p LTCS . Denies any complaints    O:   Vital Signs Last 24 Hrs  T(C): 36.8 (15 Apr 2021 10:00), Max: 36.9 (2021 13:01)  T(F): 98.2 (15 Apr 2021 10:00), Max: 98.42 (2021 13:01)  HR: 82 (15 Apr 2021 10:00) (53 - 106)  BP: 105/58 (15 Apr 2021 10:00) (105/58 - 132/71)  BP(mean): 80 (2021 21:00) (74 - 93)  RR: 18 (15 Apr 2021 10:00) (12 - 26)  SpO2: 100% (15 Apr 2021 10:00) (96% - 100%)    Labs:  Blood type: A Negative  Rubella IgG: RPR: Negative                          9.5<L>   10.13 >-----------< 154    ( 04-15 @ 06:42 )             28.4<L>                        11.6   8.23 >-----------< 185    (  @ 13:29 )             35.5      PE:    Abdomen: soft fundus firm Mildly distended, tenderness appropriate  incision c/d/i.  Extremities: No erythema, trace edema    A/P: 25yo POD#1 s/p R-LTCS  - Continue regular diet.  - Increase ambulation.  - Continue motrin, tylenol, oxycodone PRN for pain control.  vs. continue PCEA for pain.  - F/u AM CBC    Barbara Yi M.D., M.B.A., M.S. Associate Chief of L & D ( late entry)     I have met this patient for the first time today.  She was admitted by Dr Cross and delivered for repeat c section    OB Progress Note:  Delivery, POD#1    S: 25yo POD#1 s/p LTCS . Denies any complaints    O:   Vital Signs Last 24 Hrs  T(C): 36.8 (15 Apr 2021 10:00), Max: 36.9 (2021 13:01)  T(F): 98.2 (15 Apr 2021 10:00), Max: 98.42 (2021 13:01)  HR: 82 (15 Apr 2021 10:00) (53 - 106)  BP: 105/58 (15 Apr 2021 10:00) (105/58 - 132/71)  BP(mean): 80 (2021 21:00) (74 - 93)  RR: 18 (15 Apr 2021 10:00) (12 - 26)  SpO2: 100% (15 Apr 2021 10:00) (96% - 100%)    Labs:  Blood type: A Negative  Rubella IgG: RPR: Negative                          9.5<L>   10.13 >-----------< 154    ( 15 @ 06:42 )             28.4<L>                        11.6   8.23 >-----------< 185    (  @ 13:29 )             35.5      PE:    Abdomen: soft fundus firm Mildly distended, tenderness appropriate  incision c/d/i.  Extremities: No erythema, trace edema    A/P: 25yo POD#1 s/p R-LTCS and acute blood loss and h/o PPD  - Continue regular diet.  - Increase ambulation.  - Continue motrin, tylenol, oxycodone PRN for pain control.  vs. continue PCEA for pain.  - F/u AM CBC  - SW to see patient for SI and PPD    Barbara Yi M.D., M.B.A., M.S.

## 2021-04-15 NOTE — PROGRESS NOTE ADULT - PROBLEM SELECTOR PLAN 1
- Continue with po analgesia  - Increase ambulation  - Continue regular diet  - d/c IV fluids  - Check CBC  - D/c Alicea  - Incision dressing removed    Radha Nuno, PGY-1 - Continue with po analgesia  - Increase ambulation  - Continue regular diet  - d/c IV fluids  - Acute blood loss anemia appropriate given EBL  - Bonding well with baby     Radha Nuno, PGY-1

## 2021-04-15 NOTE — DISCHARGE NOTE OB - PATIENT PORTAL LINK FT
You can access the FollowMyHealth Patient Portal offered by [facility] by registering at the following website: [email or web address]. By joining [facility]’s FollowMyHealth portal, you will also be able to view your health information using other applications (apps) compatible with our system.

## 2021-04-16 VITALS
DIASTOLIC BLOOD PRESSURE: 70 MMHG | HEART RATE: 80 BPM | SYSTOLIC BLOOD PRESSURE: 115 MMHG | RESPIRATION RATE: 18 BRPM | TEMPERATURE: 98 F | OXYGEN SATURATION: 100 %

## 2021-04-16 RX ADMIN — HEPARIN SODIUM 5000 UNIT(S): 5000 INJECTION INTRAVENOUS; SUBCUTANEOUS at 01:38

## 2021-04-16 RX ADMIN — Medication 975 MILLIGRAM(S): at 06:00

## 2021-04-16 RX ADMIN — Medication 600 MILLIGRAM(S): at 06:30

## 2021-04-16 RX ADMIN — Medication 975 MILLIGRAM(S): at 06:30

## 2021-04-16 RX ADMIN — Medication 600 MILLIGRAM(S): at 06:00

## 2021-04-16 NOTE — PROGRESS NOTE ADULT - SUBJECTIVE AND OBJECTIVE BOX
Patient seen and examined at bedside, no acute overnight events. No acute concerns, pain well controlled. Patient is ambulating and tolerating regular diet. Has not yet passed flatus. Voiding spontaneously. Denies CP, SOB, N/V, HA, blurred vision, epigastric pain.    Vital Signs Last 24 Hours  T(C): 36.6 (04-15-21 @ 01:10), Max: 36.9 (04-14-21 @ 13:01)  HR: 78 (04-15-21 @ 01:10) (53 - 106)  BP: 105/67 (04-15-21 @ 01:10) (105/67 - 132/71)  RR: 18 (04-15-21 @ 01:10) (12 - 26)  SpO2: 100% (04-15-21 @ 01:10) (96% - 100%)    I&O's Summary    14 Apr 2021 07:01  -  15 Apr 2021 06:02  --------------------------------------------------------  IN: 4700 mL / OUT: 1853 mL / NET: 2847 mL        Physical Exam:  General: NAD  Abdomen: Soft, non-tender, non-distended, fundus firm  Incision: Pfannensteil incision CDI, subcuticular suture closure  Pelvic: Lochia wnl    Labs:    Blood Type: A Negative  Antibody Screen: Positive  RPR: Negative               11.6   8.23  )-----------( 185      ( 04-14 @ 13:29 )             35.5                10.4   7.10  )-----------( 187      ( 04-01 @ 13:55 )             32.3                10.5   8.00  )-----------( 196      ( 03-30 @ 13:46 )             32.8         MEDICATIONS  (STANDING):  acetaminophen   Tablet .. 975 milliGRAM(s) Oral <User Schedule>  acetaminophen  IVPB .. 1000 milliGRAM(s) IV Intermittent once  diphtheria/tetanus/pertussis (acellular) Vaccine (ADAcel) 0.5 milliLiter(s) IntraMuscular once  heparin   Injectable 5000 Unit(s) SubCutaneous every 12 hours  ibuprofen  Tablet. 600 milliGRAM(s) Oral every 6 hours  ketorolac   Injectable 30 milliGRAM(s) IV Push every 6 hours  lactated ringers. 1000 milliLiter(s) (75 mL/Hr) IV Continuous <Continuous>  methylergonovine 0.2 milliGRAM(s) Oral every 4 hours  morphine PF Spinal 0.1 milliGRAM(s) IntraThecal. once    MEDICATIONS  (PRN):  dexAMETHasone  Injectable 4 milliGRAM(s) IV Push every 6 hours PRN Nausea  diphenhydrAMINE 25 milliGRAM(s) Oral every 6 hours PRN Pruritus  lanolin Ointment 1 Application(s) Topical every 6 hours PRN Sore Nipples  magnesium hydroxide Suspension 30 milliLiter(s) Oral two times a day PRN Constipation  naloxone Injectable 0.1 milliGRAM(s) IV Push every 3 minutes PRN For ANY of the following changes in patient status:  A. Breaths Per Minute LESS THAN 10, B. Oxygen saturation LESS THAN 90%, C. Sedation score of 6 for Stop After: 4 Times  ondansetron Injectable 4 milliGRAM(s) IV Push every 6 hours PRN Nausea  oxyCODONE    IR 5 milliGRAM(s) Oral every 3 hours PRN Mild Pain (1 - 3)  oxyCODONE    IR 10 milliGRAM(s) Oral every 3 hours PRN Moderate Pain (4 - 6)  oxyCODONE    IR 5 milliGRAM(s) Oral every 3 hours PRN Moderate to Severe Pain (4-10)  oxyCODONE    IR 5 milliGRAM(s) Oral once PRN Moderate to Severe Pain (4-10)  simethicone 80 milliGRAM(s) Chew every 4 hours PRN Gas      
POST OP DAY  1  s/p   SECTION    SUBJECTIVE:  I'm ok.    PAIN SCALE SCORE: [x] Refer to charted pain scores    THERAPY:  [ x ] Spinal morphine   [  ] Epidural morphine   [  ] IV PCA Hydromorphone 1 mg/ml    OBJECTIVE:  Comfortable Appearing    SEDATION SCORE:	  [ x ] Alert	    [  ] Drowsy        [  ] Arousable	[  ] Asleep	[  ] Unresponsive    Side Effects:	  [ x ] None	     [  ] Nausea        [  ] Pruritus        [  ] Weakness   [  ] Numbness        ASSESSMENT/ PLAN   [   ] Discontinue         [  ] Continue    [ x ] Change to prn Analgesics as per primary service.    DOCUMENTATION & VERIFICATION OF CURRENT MEDS [ x ] Done    COMMENTS: No Headache.  
Patient seen and examined at bedside, no acute overnight events. No acute concerns, pain well controlled. Patient is ambulating, voiding spontaneously, passing flatus, and tolerating regular diet. Denies CP, SOB, N/V, HA, blurred vision, epigastric pain.    Vital Signs Last 24 Hours  T(C): 36.6 (04-16-21 @ 05:34), Max: 36.9 (04-15-21 @ 14:00)  HR: 72 (04-16-21 @ 05:34) (72 - 85)  BP: 103/69 (04-16-21 @ 05:34) (96/62 - 112/74)  RR: 18 (04-16-21 @ 05:34) (18 - 18)  SpO2: 99% (04-16-21 @ 05:34) (98% - 100%)    Physical Exam:  General: NAD  Abdomen: Soft, non-tender, non-distended, fundus firm  Incision: Pfannensteil incision CDI, subcuticular suture closure  Pelvic: Lochia wnl    Labs:    Blood Type: A Negative  Antibody Screen: Positive  RPR: Negative               9.5    10.13 )-----------( 154      ( 04-15 @ 06:42 )             28.4                11.6   8.23  )-----------( 185      ( 04-14 @ 13:29 )             35.5                10.4   7.10  )-----------( 187      ( 04-01 @ 13:55 )             32.3         MEDICATIONS  (STANDING):  acetaminophen   Tablet .. 975 milliGRAM(s) Oral <User Schedule>  acetaminophen  IVPB .. 1000 milliGRAM(s) IV Intermittent once  diphtheria/tetanus/pertussis (acellular) Vaccine (ADAcel) 0.5 milliLiter(s) IntraMuscular once  heparin   Injectable 5000 Unit(s) SubCutaneous every 12 hours  ibuprofen  Tablet. 600 milliGRAM(s) Oral every 6 hours  lactated ringers. 1000 milliLiter(s) (75 mL/Hr) IV Continuous <Continuous>    MEDICATIONS  (PRN):  diphenhydrAMINE 25 milliGRAM(s) Oral every 6 hours PRN Pruritus  lanolin Ointment 1 Application(s) Topical every 6 hours PRN Sore Nipples  magnesium hydroxide Suspension 30 milliLiter(s) Oral two times a day PRN Constipation  ondansetron Injectable 4 milliGRAM(s) IV Push every 6 hours PRN Nausea  oxyCODONE    IR 5 milliGRAM(s) Oral every 3 hours PRN Moderate to Severe Pain (4-10)  oxyCODONE    IR 5 milliGRAM(s) Oral once PRN Moderate to Severe Pain (4-10)  simethicone 80 milliGRAM(s) Chew every 4 hours PRN Gas

## 2021-04-16 NOTE — PROGRESS NOTE ADULT - PROBLEM SELECTOR PLAN 1
- Continue with po analgesia  - Increase ambulation  - Continue regular diet  - Bonding well with baby     Radha Nuno, PGY-1

## 2021-04-16 NOTE — PROGRESS NOTE ADULT - ASSESSMENT
27y/o  POD#2 from TCS (EBL=687) in stable condition. PMH significant for sPEC with prior pregnancy, suicide attempt in 2018. No current s/s of PEC.

## 2021-04-16 NOTE — PROGRESS NOTE ADULT - ATTENDING COMMENTS
Associate Chief of L & D ( late entry)      OB Progress Note:  Delivery, POD#2    S: 25yo POD#2 s/p LTCS . Denies any complaints    O:   Vital Signs Last 24 Hrs  Vital Signs Last 24 Hrs  T(C): 36.6 (2021 05:34), Max: 36.9 (15 Apr 2021 14:00)  T(F): 97.9 (2021 05:34), Max: 98.4 (15 Apr 2021 14:00)  HR: 72 (2021 05:34) (72 - 85)  BP: 103/69 (2021 05:34) (96/62 - 112/74)  BP(mean): --R: 18 (2021 05:34) (18 - 18)  SpO2: 99% (2021 05:34) (99% - 100%)    Labs:  Blood type: A Negative  Rubella IgG: RPR: Negative                          9.5<L>   10.13 >-----------< 154    ( 04-15 @ 06:42 )             28.4<L>                        11.6   8.23 >-----------< 185    (  @ 13:29 )             35.5      PE:    Abdomen: soft fundus firm Mildly distended, tenderness appropriate  incision c/d/i.  Extremities: No erythema, trace edema    A/P: 25yo POD#2 s/p R-LTCS and acute blood loss and h/o PPD    - Pateint is stable for discharge and will follow up in the PP clinic  -  to see patient for SI and PPD    Barbara Yi M.D., M.B.A., M.S. Associate Chief of L & D ( late entry)      OB Progress Note:  Delivery, POD#2    S: 27yo POD#2 s/p LTCS . Denies any complaints    O:   Vital Signs Last 24 Hrs  Vital Signs Last 24 Hrs  T(C): 36.6 (2021 05:34), Max: 36.9 (15 Apr 2021 14:00)  T(F): 97.9 (2021 05:34), Max: 98.4 (15 Apr 2021 14:00)  HR: 72 (2021 05:34) (72 - 85)  BP: 103/69 (2021 05:34) (96/62 - 112/74)  BP(mean): --R: 18 (2021 05:34) (18 - 18)  SpO2: 99% (2021 05:34) (99% - 100%)    Labs:  Blood type: A Negative  Rubella IgG: RPR: Negative                          9.5<L>   10.13 >-----------< 154    ( 04-15 @ 06:42 )             28.4<L>                        11.6   8.23 >-----------< 185    (  @ 13:29 )             35.5      PE:    Abdomen: soft fundus firm Mildly distended, tenderness appropriate  incision c/d/i.  Extremities: No erythema, trace edema    A/P: 27yo POD#2 s/p R-LTCS and acute blood loss and h/o PPD    - Patient is stable for discharge and will follow up in the PP clinic  - Patient will need to monitor BP's  - SW to see patient for SI and PPD    Barbara Yi M.D., M.B.A., M.S.

## 2021-04-17 ENCOUNTER — NON-APPOINTMENT (OUTPATIENT)
Age: 26
End: 2021-04-17

## 2021-04-17 LAB — SARS-COV-2 N GENE NPH QL NAA+PROBE: NOT DETECTED

## 2021-04-19 ENCOUNTER — NON-APPOINTMENT (OUTPATIENT)
Age: 26
End: 2021-04-19

## 2021-04-19 RX ORDER — IBUPROFEN 600 MG/1
600 TABLET, FILM COATED ORAL EVERY 6 HOURS
Refills: 0 | Status: ACTIVE | COMMUNITY
Start: 2021-04-19

## 2021-04-19 RX ORDER — ASPIRIN ENTERIC COATED TABLETS 81 MG 81 MG/1
81 TABLET, DELAYED RELEASE ORAL DAILY
Qty: 30 | Refills: 5 | Status: DISCONTINUED | COMMUNITY
Start: 2020-10-21 | End: 2021-04-19

## 2021-04-19 RX ORDER — ACETAMINOPHEN 500 MG/1
500 TABLET ORAL
Refills: 0 | Status: ACTIVE | COMMUNITY
Start: 2021-04-19

## 2021-04-22 ENCOUNTER — APPOINTMENT (OUTPATIENT)
Dept: OBGYN | Facility: HOSPITAL | Age: 26
End: 2021-04-22

## 2021-04-27 ENCOUNTER — APPOINTMENT (OUTPATIENT)
Dept: OBGYN | Facility: HOSPITAL | Age: 26
End: 2021-04-27

## 2021-04-27 ENCOUNTER — NON-APPOINTMENT (OUTPATIENT)
Age: 26
End: 2021-04-27

## 2021-05-03 ENCOUNTER — NON-APPOINTMENT (OUTPATIENT)
Age: 26
End: 2021-05-03

## 2021-05-03 NOTE — OB PROVIDER H&P - NSNYCREQUIREMENTS_OBGYN_ALL_OB
Consuelo Lara is a 13year old 10 month old female who was brought in for her  Well Child (Annual physical ) visit. Subjective   History was provided by patient and mother  HPI:   Patient presents for:  Patient presents with:   Well Child: Annual physical 5\" (1.651 m)     Body mass index is 22.96 kg/m². 80 %ile (Z= 0.85) based on CDC (Girls, 2-20 Years) BMI-for-age based on BMI available as of 5/3/2021.       Constitutional: appears well hydrated, alert and responsive, no acute distress noted  Head/Face: N Future      PLAN:   1. Xray of ankle  2. Use crutches as needed  3. Use an over the counter AIR CAST  4. OTC tylenol or motrin as needed  5. Follow up yearly as needed  Reinforced healthy diet, lifestyle, and exercise.   Immunizations up to date Parental co REBEKA

## 2021-05-04 ENCOUNTER — NON-APPOINTMENT (OUTPATIENT)
Age: 26
End: 2021-05-04

## 2021-05-04 ENCOUNTER — APPOINTMENT (OUTPATIENT)
Dept: OBGYN | Facility: HOSPITAL | Age: 26
End: 2021-05-04

## 2021-05-04 ENCOUNTER — OUTPATIENT (OUTPATIENT)
Dept: OUTPATIENT SERVICES | Facility: HOSPITAL | Age: 26
LOS: 1 days | End: 2021-05-04

## 2021-05-04 VITALS
HEIGHT: 64 IN | DIASTOLIC BLOOD PRESSURE: 68 MMHG | BODY MASS INDEX: 21.17 KG/M2 | WEIGHT: 124 LBS | TEMPERATURE: 98.2 F | SYSTOLIC BLOOD PRESSURE: 115 MMHG | HEART RATE: 105 BPM

## 2021-05-04 DIAGNOSIS — Z34.83 ENCOUNTER FOR SUPERVISION OF OTHER NORMAL PREGNANCY, THIRD TRIMESTER: ICD-10-CM

## 2021-05-04 DIAGNOSIS — Z34.00 ENCOUNTER FOR SUPERVISION OF NORMAL FIRST PREGNANCY, UNSPECIFIED TRIMESTER: ICD-10-CM

## 2021-05-04 DIAGNOSIS — Z98.891 HISTORY OF UTERINE SCAR FROM PREVIOUS SURGERY: Chronic | ICD-10-CM

## 2021-05-04 DIAGNOSIS — O34.219 MATERNAL CARE FOR UNSPECIFIED TYPE SCAR FROM PREVIOUS CESAREAN DELIVERY: ICD-10-CM

## 2021-05-04 DIAGNOSIS — Z92.29 PERSONAL HISTORY OF OTHER DRUG THERAPY: ICD-10-CM

## 2021-05-04 NOTE — HISTORY OF PRESENT ILLNESS
[Delivery Date: ___] : on [unfilled] [Repeat C/S] : delivered by  section (repeat) [Male] : Delivery History: baby boy [Wt. ___] : weighing [unfilled] [Pertussis Vaccine] : Pertussis vaccine administered [Breastfeeding] : currently nursing [Intended Contraception] : Intended Contraception: [None] : No associated symptoms are reported [Clean/Dry/Intact] : clean, dry and intact [Last Pap Date: ___] : Last Pap Date: [unfilled] [Doing Well] : is doing well [Rhogam] : Rhogam administered [Rubella Vaccine] : Rubella vaccine was not administered [BTL] : no tubal ligation [Resumed Menses] : has not resumed her menses [Resumed [place]] : has not resumed intercourse [Erythema] : not erythematous [Swelling] : not swollen [Dehiscence] : not dehisced [FreeTextEntry8] : I am here for a check up [FreeTextEntry9] : rh neg [de-identified] : ne[place] [de-identified] : s/p c section feeling well emotionally hx of PP depression [de-identified] : RTC 3-4 wks no intercourse till PP visit  SW today

## 2021-05-05 DIAGNOSIS — Z51.89 ENCOUNTER FOR OTHER SPECIFIED AFTERCARE: ICD-10-CM

## 2021-05-10 ENCOUNTER — NON-APPOINTMENT (OUTPATIENT)
Age: 26
End: 2021-05-10

## 2021-05-25 ENCOUNTER — APPOINTMENT (OUTPATIENT)
Dept: OBGYN | Facility: HOSPITAL | Age: 26
End: 2021-05-25

## 2021-05-25 ENCOUNTER — NON-APPOINTMENT (OUTPATIENT)
Age: 26
End: 2021-05-25

## 2021-09-23 ENCOUNTER — APPOINTMENT (OUTPATIENT)
Dept: OBGYN | Facility: HOSPITAL | Age: 26
End: 2021-09-23

## 2021-09-23 ENCOUNTER — OUTPATIENT (OUTPATIENT)
Dept: OUTPATIENT SERVICES | Facility: HOSPITAL | Age: 26
LOS: 1 days | End: 2021-09-23

## 2021-09-23 VITALS
BODY MASS INDEX: 18.44 KG/M2 | DIASTOLIC BLOOD PRESSURE: 68 MMHG | SYSTOLIC BLOOD PRESSURE: 119 MMHG | HEIGHT: 64 IN | WEIGHT: 108 LBS | HEART RATE: 93 BPM

## 2021-09-23 DIAGNOSIS — Z30.017 ENCOUNTER FOR INITIAL PRESCRIPTION OF IMPLANTABLE SUBDERMAL CONTRACEPTIVE: ICD-10-CM

## 2021-09-23 DIAGNOSIS — Z98.891 HISTORY OF UTERINE SCAR FROM PREVIOUS SURGERY: Chronic | ICD-10-CM

## 2021-09-23 RX ORDER — ETONOGESTREL 68 MG/1
68 IMPLANT SUBCUTANEOUS
Refills: 0 | Status: ACTIVE | COMMUNITY
Start: 2021-09-23

## 2021-10-08 DIAGNOSIS — Z30.017 ENCOUNTER FOR INITIAL PRESCRIPTION OF IMPLANTABLE SUBDERMAL CONTRACEPTIVE: ICD-10-CM

## 2021-11-05 NOTE — ED ADULT NURSE NOTE - NSSISCREENINGQ2_ED_A_ED
Outreach attempt was made to schedule a Medicare Wellness Visit. This was the first attempt. Contact was made, MWV appointment refused. Patient is in [place] and comes back in May 2022. Patient refused Virtual.   Yes

## 2022-01-01 NOTE — H&P ADULT - ASSESSMENT
Patient is a 23F with no PMH presenting after intentional toxic ingestion of Aspirin admitted to ICU for monitoring of ASA levels No

## 2022-03-11 ENCOUNTER — APPOINTMENT (OUTPATIENT)
Dept: OBGYN | Facility: HOSPITAL | Age: 27
End: 2022-03-11

## 2022-03-11 ENCOUNTER — OUTPATIENT (OUTPATIENT)
Dept: OUTPATIENT SERVICES | Facility: HOSPITAL | Age: 27
LOS: 1 days | End: 2022-03-11

## 2022-03-11 VITALS
HEART RATE: 85 BPM | BODY MASS INDEX: 18.78 KG/M2 | DIASTOLIC BLOOD PRESSURE: 76 MMHG | HEIGHT: 64 IN | SYSTOLIC BLOOD PRESSURE: 126 MMHG | WEIGHT: 110 LBS | TEMPERATURE: 97.1 F

## 2022-03-11 DIAGNOSIS — Z30.46 ENCOUNTER FOR SURVEILLANCE OF IMPLANTABLE SUBDERMAL CONTRACEPTIVE: ICD-10-CM

## 2022-03-11 DIAGNOSIS — Z98.891 HISTORY OF UTERINE SCAR FROM PREVIOUS SURGERY: Chronic | ICD-10-CM

## 2022-03-11 DIAGNOSIS — Z30.09 ENCOUNTER FOR OTHER GENERAL COUNSELING AND ADVICE ON CONTRACEPTION: ICD-10-CM

## 2022-03-14 DIAGNOSIS — Z30.09 ENCOUNTER FOR OTHER GENERAL COUNSELING AND ADVICE ON CONTRACEPTION: ICD-10-CM

## 2022-03-14 DIAGNOSIS — Z30.46 ENCOUNTER FOR SURVEILLANCE OF IMPLANTABLE SUBDERMAL CONTRACEPTIVE: ICD-10-CM

## 2022-05-15 ENCOUNTER — NON-APPOINTMENT (OUTPATIENT)
Age: 27
End: 2022-05-15

## 2022-10-26 ENCOUNTER — APPOINTMENT (OUTPATIENT)
Dept: OBGYN | Facility: HOSPITAL | Age: 27
End: 2022-10-26

## 2023-07-31 NOTE — OB RN PATIENT PROFILE - INSTRUCTED TO PATIENT: IF THE INFANT IS NOT PINK DURING SKIN TO SKIN, THE PARENTS IS TO SEEK ASSISTANCE IMMEDIATELY.
[address] [facility] coding opportunities       Chart reviewed, no opportunity found: CHART REVIEWED, NO OPPORTUNITY FOUND        Patients Insurance        Commercial Insurance: Germán [employer] Statement Selected

## 2023-10-19 NOTE — OB RN PREOPERATIVE CHECKLIST - IV STARTED
Attempted to contact the pt and her father to find out if she is still taking the Zoloft per Danica's request but neither answered the phone. I left a message for her father to return the call.   yes Interpolation Flap Text: A decision was made to reconstruct the defect utilizing an interpolation axial flap and a staged reconstruction.  A telfa template was made of the defect.  This telfa template was then used to outline the interpolation flap.  The donor area for the pedicle flap was then injected with anesthesia.  The flap was excised through the skin and subcutaneous tissue down to the layer of the underlying musculature.  The interpolation flap was carefully excised within this deep plane to maintain its blood supply.  The edges of the donor site were undermined.   The donor site was closed in a primary fashion.  The pedicle was then rotated into position and sutured.  Once the tube was sutured into place, adequate blood supply was confirmed with blanching and refill.  The pedicle was then wrapped with xeroform gauze and dressed appropriately with a telfa and gauze bandage to ensure continued blood supply and protect the attached pedicle.

## 2024-08-06 NOTE — OB RN PATIENT PROFILE - TEMPERATURE IN CELSIUS (DEGREES C)
Per most recent visit note:  Continue sertraline 150 mg to target anxiety and depression.     Hydroxyzine 10-20 mg PRN anxiety - takes this ~once a mon     When family provides mail order pharmacy, will prescribe 3 month of sertraline and refill of Hydroxyzine    Per most recent hydroxyzine script sent:    hydrOXYzine HCl (ATARAX) 10 MG tablet 56 tablet 0 5/7/2024 -- No   Sig - Route: Take 1-2 tablets (10-20 mg) by mouth 2 times daily as needed for anxiety **Please schedule ollow-up appt with Dr. Machado, must have for further refills** - Oral     Routed to provider for clarification.   
Refill request received from: kerry    Last appointment: 07/31/24    RTC: Thanksgiving     Canceled appointments: 0    No Showed appointments: 0    Follow up scheduled: None    Requested medication(s) (copy and paste last order information):     Disp Refills Start End ASHLI    sertraline (ZOLOFT) 100 MG tablet 30 tablet 1 6/20/2024 -- No   Sig - Route: Take 1 tablet (100 mg) by mouth daily - Oral       Disp Refills Start End ASHLI    sertraline (ZOLOFT) 50 MG tablet 30 tablet 1 6/20/2024 -- No   Sig - Route: Take 1 tablet (50 mg) by mouth daily with one 100 mg tab for TDD of 150 mg sertraline. - Oral       Date medication last filled per outside med information and d/s: 05/28/24 for a 30d/s for both (I believe the outside med tab is not up to date)       Request was sent to [facility] for approval     
36.8

## 2025-06-06 NOTE — OB RN INTRAOPERATIVE NOTE - NS_DELIVERYATTENDING1_OBGYN_ALL_OB_FT
6/1/2025    10:04 PM   Rapid3 Question Responses and Scores   MDHAQ Score 0.4   Psychologic Score 1.1   Pain Score 0.5   When you awakened in the morning OVER THE LAST WEEK, did you feel stiff? Yes   If Yes, please indicate the number of hours until you are as limber as you will be for the day 1   Fatigue Score 0   Global Health Score 0.5   RAPID3 Score 0.78    Answers submitted by the patient for this visit:  Rheumatology Questionnaire (Submitted on 6/1/2025)  fever: No  eye redness: No  mouth sores: No  headaches: No  shortness of breath: No  chest pain: No  trouble swallowing: No  diarrhea: No  constipation: No  unexpected weight change: No  genital sore: No  During the last 3 days, have you had a skin rash?: No  Bruises or bleeds easily: No  cough: No     Wm Cross MD